# Patient Record
Sex: FEMALE | Race: BLACK OR AFRICAN AMERICAN | NOT HISPANIC OR LATINO | URBAN - METROPOLITAN AREA
[De-identification: names, ages, dates, MRNs, and addresses within clinical notes are randomized per-mention and may not be internally consistent; named-entity substitution may affect disease eponyms.]

---

## 2020-07-17 ENCOUNTER — TELEPHONE ENCOUNTER (OUTPATIENT)
Dept: URBAN - METROPOLITAN AREA CLINIC 92 | Facility: CLINIC | Age: 65
End: 2020-07-17

## 2020-07-17 RX ORDER — DICYCLOMINE HYDROCHLORIDE 10 MG/1
1 TABLET CAPSULE ORAL THREE TIMES A DAY
Qty: 90 | Refills: 1 | OUTPATIENT
Start: 2020-07-17 | End: 2020-09-15

## 2020-10-08 ENCOUNTER — TELEPHONE ENCOUNTER (OUTPATIENT)
Dept: URBAN - METROPOLITAN AREA CLINIC 82 | Facility: CLINIC | Age: 65
End: 2020-10-08

## 2020-10-08 RX ORDER — LUBIPROSTONE 24 UG/1
TAKE 1 CAPSULE (24 MCG) BY ORAL ROUTE 2 TIMES PER DAY WITH FOOD AND WATER FOR 90 DAYS CAPSULE, GELATIN COATED ORAL 2
Qty: 180 | Refills: 1
Start: 2020-04-17

## 2020-10-27 ENCOUNTER — TELEPHONE ENCOUNTER (OUTPATIENT)
Dept: URBAN - METROPOLITAN AREA CLINIC 82 | Facility: CLINIC | Age: 65
End: 2020-10-27

## 2020-10-29 ENCOUNTER — ERX REFILL RESPONSE (OUTPATIENT)
Age: 65
End: 2020-10-29

## 2020-10-29 RX ORDER — LUBIPROSTONE 24 UG/1
TAKE 1 CAPSULE (24 MCG) BY ORAL ROUTE 2 TIMES PER DAY WITH FOOD AND WATER FOR 90 DAYS CAPSULE, GELATIN COATED ORAL
Qty: 180 | Refills: 0

## 2020-11-11 ENCOUNTER — OFFICE VISIT (OUTPATIENT)
Dept: URBAN - METROPOLITAN AREA CLINIC 82 | Facility: CLINIC | Age: 65
End: 2020-11-11
Payer: COMMERCIAL

## 2020-11-11 DIAGNOSIS — R10.84 ABDOMINAL PAIN, GENERALIZED: ICD-10-CM

## 2020-11-11 DIAGNOSIS — K50.90 CROHN DISEASE: ICD-10-CM

## 2020-11-11 DIAGNOSIS — K59.00 CONSTIPATION: ICD-10-CM

## 2020-11-11 DIAGNOSIS — R14.0 BLOATING AND GAS: ICD-10-CM

## 2020-11-11 PROCEDURE — G8417 CALC BMI ABV UP PARAM F/U: HCPCS | Performed by: INTERNAL MEDICINE

## 2020-11-11 PROCEDURE — G8427 DOCREV CUR MEDS BY ELIG CLIN: HCPCS | Performed by: INTERNAL MEDICINE

## 2020-11-11 PROCEDURE — 1036F TOBACCO NON-USER: CPT | Performed by: INTERNAL MEDICINE

## 2020-11-11 PROCEDURE — 99214 OFFICE O/P EST MOD 30 MIN: CPT | Performed by: INTERNAL MEDICINE

## 2020-11-11 RX ORDER — ATENOLOL 25 MG/1
TAKE 1 TABLET (25 MG) BY ORAL ROUTE ONCE DAILY TABLET ORAL 1
Qty: 0 | Refills: 0 | Status: ACTIVE | COMMUNITY
Start: 1900-01-01 | End: 1900-01-01

## 2020-11-11 RX ORDER — CYANOCOBALAMIN (VITAMIN B-12) 500 MCG
TABLET ORAL
Qty: 0 | Refills: 0 | Status: ACTIVE | COMMUNITY
Start: 1900-01-01 | End: 1900-01-01

## 2020-11-11 RX ORDER — FOLIC ACID 1 MG/1
TAKE 1 TABLET (1 MG) BY ORAL ROUTE ONCE DAILY TABLET ORAL 1
Qty: 0 | Refills: 0 | Status: ACTIVE | COMMUNITY
Start: 1900-01-01 | End: 1900-01-01

## 2020-11-11 RX ORDER — BIOTIN 2500 MCG
TAKE 1 CAPSULE BY ORAL ROUTE DAILY CAPSULE ORAL 1
Qty: 0 | Refills: 0 | Status: ACTIVE | COMMUNITY
Start: 1900-01-01 | End: 1900-01-01

## 2020-11-11 RX ORDER — ESCITALOPRAM 20 MG/1
TAKE 1 TABLET (20 MG) BY ORAL ROUTE ONCE DAILY TABLET, FILM COATED ORAL 1
Qty: 0 | Refills: 0 | Status: ACTIVE | COMMUNITY
Start: 1900-01-01 | End: 1900-01-01

## 2020-11-11 RX ORDER — PANTOPRAZOLE SODIUM 40 MG/1
TAKE 1 TABLET (40 MG) BY ORAL ROUTE ONCE DAILY TABLET, DELAYED RELEASE ORAL 1
Qty: 0 | Refills: 0 | Status: ACTIVE | COMMUNITY
Start: 1900-01-01 | End: 1900-01-01

## 2020-11-11 RX ORDER — FUROSEMIDE 40 MG/1
TAKE 1 TABLET (40 MG) BY ORAL ROUTE ONCE DAILY TABLET ORAL 1
Qty: 0 | Refills: 0 | Status: ACTIVE | COMMUNITY
Start: 1900-01-01 | End: 1900-01-01

## 2020-11-11 RX ORDER — NITROGLYCERIN 0.4 MG/1
PLACE 1 TABLET (0.4 MG) BY BUCCAL ROUTE AT THE FIRST SIGN OF AN ATTACK; NO MORE THAN 3 TABS ARE RECOMMENDED WITHIN A 15 MINUTE PERIOD TABLET SUBLINGUAL
Qty: 1 | Refills: 0 | Status: ACTIVE | COMMUNITY
Start: 1900-01-01 | End: 1900-01-01

## 2020-11-11 RX ORDER — FUROSEMIDE 40 MG/1
TABLET ORAL
Qty: 0 | Refills: 0 | Status: ACTIVE | COMMUNITY
Start: 1900-01-01 | End: 1900-01-01

## 2020-11-11 RX ORDER — WARFARIN SODIUM 1 MG/1
TAKE 1 TABLET (1 MG) BY ORAL ROUTE ONCE DAILY TABLET ORAL 1
Qty: 0 | Refills: 0 | Status: ACTIVE | COMMUNITY
Start: 1900-01-01 | End: 1900-01-01

## 2020-11-11 RX ORDER — VERAPAMIL HYDROCHLORIDE 180 MG/1
TAKE 1 TABLET (180 MG) BY ORAL ROUTE ONCE DAILY WITH FOOD TABLET ORAL 1
Qty: 0 | Refills: 0 | Status: ACTIVE | COMMUNITY
Start: 1900-01-01 | End: 1900-01-01

## 2020-11-11 RX ORDER — PREGABALIN 200 MG
TAKE 1 CAPSULE (200 MG) BY ORAL ROUTE 2 TIMES PER DAY CAPSULE ORAL 2
Qty: 0 | Refills: 0 | Status: ACTIVE | COMMUNITY
Start: 1900-01-01 | End: 1900-01-01

## 2020-11-11 RX ORDER — WARFARIN 4 MG/1
TAKE 1 TABLET (4 MG) BY ORAL ROUTE ONCE DAILY TABLET ORAL 1
Qty: 0 | Refills: 0 | Status: ACTIVE | COMMUNITY
Start: 1900-01-01 | End: 1900-01-01

## 2020-11-11 RX ORDER — HYOSCYAMINE SULFATE 0.12 MG/1
1 TABLET AS NEEDED TABLET ORAL
Qty: 90 | Refills: 1 | OUTPATIENT
Start: 2020-11-11 | End: 2021-01-10

## 2020-11-11 RX ORDER — METOPROLOL SUCCINATE 50 MG/1
TABLET, EXTENDED RELEASE ORAL
Qty: 0 | Refills: 0 | Status: ACTIVE | COMMUNITY
Start: 1900-01-01 | End: 1900-01-01

## 2020-11-11 RX ORDER — TRAZODONE HYDROCHLORIDE 100 MG/1
TAKE 1 TABLET (100 MG) BY ORAL ROUTE ONCE DAILY AT BEDTIME TABLET, FILM COATED ORAL 1
Qty: 0 | Refills: 0 | Status: ACTIVE | COMMUNITY
Start: 1900-01-01 | End: 1900-01-01

## 2020-11-11 RX ORDER — FENTANYL 25 UG/H
APPLY 1 PATCH (25 MCG/HOUR) BY TRANSDERMAL ROUTE EVERY 72 HOURS PATCH, EXTENDED RELEASE TRANSDERMAL
Qty: 0 | Refills: 0 | Status: ACTIVE | COMMUNITY
Start: 1900-01-01 | End: 1900-01-01

## 2020-11-11 RX ORDER — LUBIPROSTONE 24 UG/1
TAKE 1 CAPSULE (24 MCG) BY ORAL ROUTE 2 TIMES PER DAY WITH FOOD AND WATER FOR 90 DAYS CAPSULE, GELATIN COATED ORAL
Qty: 180 | Refills: 0 | Status: ACTIVE | COMMUNITY

## 2020-11-11 RX ORDER — RANOLAZINE 500 MG/1
TAKE 1 TABLET (500 MG) BY ORAL ROUTE 2 TIMES PER DAY TABLET, FILM COATED, EXTENDED RELEASE ORAL 2
Qty: 0 | Refills: 0 | Status: ACTIVE | COMMUNITY
Start: 1900-01-01 | End: 1900-01-01

## 2020-11-11 RX ORDER — HYDROXYZINE HYDROCHLORIDE 10 MG/1
TAKE 1 TABLET BY ORAL ROUTE 2 TIMES A DAY TABLET ORAL 2
Qty: 0 | Refills: 0 | Status: ACTIVE | COMMUNITY
Start: 1900-01-01 | End: 1900-01-01

## 2020-11-11 NOTE — HPI-OTHER HISTORIES
This is a follow-up appointment for this patient, a 65 year old /Black female, after a previous visit on 11/12/2015, for an evaluation for medication refill . The patient is here to refill on her hyoscyamine since the pharmacy said that she has been feeling ok from a GI standpoint otherwise. She denies abdominal pain, diarrhea, or blood in the stool.         09/29/2016 Colonoscopy in 2014 did not show any active colitis. Patient complains of upper abdomen and infraumbilical area abdominal pain. She says it began monday night. Patient was admitted into [facility] on July 27, 2016. She says her bowels are fine but complains of severe pain. Patient discontinued Bentyl. She is still taking Protonix. Patient complains of stress and depression.  Denies rectal bleeding.  01/04/2017 Patient says she feels okay. She complains of abdominal cramping, excess noise and bloating. She also complains of constipation with one bowel movement every 4 days. She believes it is because of Lyrica. She is on Dulcolax and Miralax. Denies diarrhea. She says the Dicyclomine leaves a weird taste in her mouth.  02/14/2017 Colonoscopy done 01/31/17 showed normal colonic mucosa, diverticulosis and poor prep. No coltis noted. EGD done on 01/31/17 showed schatzki's ring. Status post dilatation. Gastritis and duodenitis noted. Biopsies were normal for both procedures. Patient is doing well. Denies abdominal pain and rectal bleeding. She is currently on Trazadone, Pentasa, Lyrica and Bentyl. She complains of gout.  05/17/2017 Patient complains of constipation with one bowel movement every 2-3 days. She says she feels like she always needs to have a bowel movement but says she can't. She also complains of straining while trying to have a bowel movement, bloating and lower abdominal pain. Patient is currently on Bentyl tid. She is no longer on Pentasa. Denies any new changes in eating habits.  09/19/2017 Labwork done 05/17/17 showed normal thyroid levels, hemoglobin and liver enzymes. Patient is doing much better. She was recently and was found to have elevated Potassium levels. She admits to occasional lower abdominal pain and change in bowel habits of constipation followed by diarrhea. She was unable to fill Linzess as insurance wouldn't afford it and she could not afford it so she is currently on Miralax. Patient is currently on Bentyl tid. Denies abdominal pain and nausea.  12/08/2017 Labwork done by Dr. Pagan showed slight anemia with a hemoglobin of 10. She is currently on oral iron supplementation and she says she has been on supplementation for a long time. Patient admits to a change in bowel habits with bouts of constipation followed by diarrhea. She also admits to occasional weakness and occasional acid reflux but says it is not often. Denies abdominal pain. Colonoscopy done in 01/2017 was normal and EGD done in 01/2017 showed gastritis.  07/11/2018 Patient admits to occasional cramping and bloating. States she has occasional constipation, but no symptoms in the past 2 weeks. Patient takes iron supplementations. Patient takes Protonix and dicyclomine. Patient admits to occasional fatigue. States she was diagnosed with gout. Symptoms improved. No melena or hematochezia.  1/16/2019 Admits to occasional constipation, has bowel movement every 2-3 days. Currently not on any medication for constipation.  Admits to occasional bloating and abdominal pain. States Linzess not convered by insurance.  Patient states she is still taking Protonix.  4/17/2019 Patient states she saw the NP at Dr. Pagan's office, for frequent nosebleeds. She was never referred to an ENT doctor. She only had labwork done, and was told she had WBC's in her urine. She states Linzess helps her have a bowel movement 3 times a day. She denies any heartburn, states she has acid reflux occasionally. She is not on any medication for acid reflux. She denies any Crohn's flare up recently. Last colonoscopy was done in 2017. She states everything is much better now. She denies any melena or hematochezia.  10/16/2019 Patient states she is still having constipation. She states she has bowel movements every 2 days. She is currently on Linzess and says some days it works and other days it doesn't. She states Linzess occasionally gives her loose bowels. She denies any heartburn or acid reflux. She is currently on Protonix 40 mg po qd.  She states neuropathy is bothering her in her legs and feet. Previous colonoscopy done at the hospital in 2017, showed poor prep. Denies any melena or hematochezia.  02/19/2020 Patient complains of abdominal pain in the lower abdomen, for a couple of weeks and continues to worsen. She does report constipation. She is taking Trulance, which worked well in the beginning and then stopped having bowel movements, until every 4 days. She states Miralax worked for her. She states Linzess did not work for her either. She has never tried Amitiza in the past. Denies any fever or chills. She states abdominal pain improved, not as bad as before. She states feeling that the lining of the abdomen feels raw. She continues to take dicyclomine daily with no improvement.  04/17/2020 I called the patient. She complains of continued problems with her bowels. She still has symptoms of constipation, with bowel movements every 4-5 days. She was taking Amitiza 8 mcg po bid, which was helping for a while. She denies any bloating, but does report abdominal cramping.

## 2020-11-11 NOTE — HPI-TODAY'S VISIT:
11/11/2020 Patient presents for office follow up visit. She reports some occasional constipation. She states Amitiza po bid, helped better than Linzess and Trulance. She is having bowel movements every two days. She has mild acid reflux. She has recently started Percocet, but stopped taking it as it hasn't helped with her neck pain.

## 2021-01-26 ENCOUNTER — TELEPHONE ENCOUNTER (OUTPATIENT)
Dept: URBAN - METROPOLITAN AREA CLINIC 82 | Facility: CLINIC | Age: 66
End: 2021-01-26

## 2021-01-26 RX ORDER — DICYCLOMINE HYDROCHLORIDE 10 MG/1
1 TABLET CAPSULE ORAL THREE TIMES A DAY
Qty: 270 TABLET | Refills: 1 | OUTPATIENT
Start: 2021-01-26 | End: 2021-07-25

## 2021-02-02 ENCOUNTER — TELEPHONE ENCOUNTER (OUTPATIENT)
Dept: URBAN - METROPOLITAN AREA CLINIC 82 | Facility: CLINIC | Age: 66
End: 2021-02-02

## 2021-02-02 RX ORDER — DICYCLOMINE HYDROCHLORIDE 10 MG/1
1 TABLET CAPSULE ORAL THREE TIMES A DAY
Qty: 270
Start: 2021-01-26

## 2021-02-08 ENCOUNTER — TELEPHONE ENCOUNTER (OUTPATIENT)
Dept: URBAN - METROPOLITAN AREA CLINIC 82 | Facility: CLINIC | Age: 66
End: 2021-02-08

## 2021-02-08 RX ORDER — LUBIPROSTONE 24 UG/1
1 CAPSULE WITH FOOD AND WATER CAPSULE, GELATIN COATED ORAL TWICE A DAY
Qty: 180 CAPSULE | Refills: 2

## 2021-04-26 ENCOUNTER — TELEPHONE ENCOUNTER (OUTPATIENT)
Dept: URBAN - METROPOLITAN AREA CLINIC 82 | Facility: CLINIC | Age: 66
End: 2021-04-26

## 2021-04-26 RX ORDER — PANTOPRAZOLE SODIUM 40 MG/1
1 TABLET TABLET, DELAYED RELEASE ORAL 1
Qty: 90 | Refills: 1

## 2021-05-12 ENCOUNTER — OFFICE VISIT (OUTPATIENT)
Dept: URBAN - METROPOLITAN AREA CLINIC 82 | Facility: CLINIC | Age: 66
End: 2021-05-12
Payer: COMMERCIAL

## 2021-05-12 VITALS
DIASTOLIC BLOOD PRESSURE: 68 MMHG | HEART RATE: 73 BPM | RESPIRATION RATE: 18 BRPM | HEIGHT: 66 IN | BODY MASS INDEX: 33.56 KG/M2 | TEMPERATURE: 97.1 F | SYSTOLIC BLOOD PRESSURE: 109 MMHG | WEIGHT: 208.8 LBS

## 2021-05-12 DIAGNOSIS — K59.00 CONSTIPATION: ICD-10-CM

## 2021-05-12 DIAGNOSIS — R10.84 ABDOMINAL PAIN, GENERALIZED: ICD-10-CM

## 2021-05-12 DIAGNOSIS — K50.90 CROHN DISEASE: ICD-10-CM

## 2021-05-12 DIAGNOSIS — R14.0 BLOATING AND GAS: ICD-10-CM

## 2021-05-12 PROCEDURE — G9903 PT SCRN TBCO ID AS NON USER: HCPCS | Performed by: INTERNAL MEDICINE

## 2021-05-12 PROCEDURE — 99214 OFFICE O/P EST MOD 30 MIN: CPT | Performed by: INTERNAL MEDICINE

## 2021-05-12 PROCEDURE — G8417 CALC BMI ABV UP PARAM F/U: HCPCS | Performed by: INTERNAL MEDICINE

## 2021-05-12 PROCEDURE — G8427 DOCREV CUR MEDS BY ELIG CLIN: HCPCS | Performed by: INTERNAL MEDICINE

## 2021-05-12 RX ORDER — PANTOPRAZOLE SODIUM 40 MG/1
1 TABLET TABLET, DELAYED RELEASE ORAL 1
Qty: 90 | Refills: 1 | Status: ACTIVE | COMMUNITY

## 2021-05-12 RX ORDER — VERAPAMIL HYDROCHLORIDE 180 MG/1
TAKE 1 TABLET (180 MG) BY ORAL ROUTE ONCE DAILY WITH FOOD TABLET ORAL 1
Qty: 0 | Refills: 0 | Status: ACTIVE | COMMUNITY
Start: 1900-01-01

## 2021-05-12 RX ORDER — TRAZODONE HYDROCHLORIDE 100 MG/1
TAKE 1 TABLET (100 MG) BY ORAL ROUTE ONCE DAILY AT BEDTIME TABLET, FILM COATED ORAL 1
Qty: 0 | Refills: 0 | Status: ACTIVE | COMMUNITY
Start: 1900-01-01

## 2021-05-12 RX ORDER — BIOTIN 2500 MCG
TAKE 1 CAPSULE BY ORAL ROUTE DAILY CAPSULE ORAL 1
Qty: 0 | Refills: 0 | Status: ACTIVE | COMMUNITY
Start: 1900-01-01

## 2021-05-12 RX ORDER — LUBIPROSTONE 24 UG/1
1 CAPSULE WITH FOOD AND WATER CAPSULE, GELATIN COATED ORAL TWICE A DAY
Qty: 180 CAPSULE | Refills: 2 | Status: ACTIVE | COMMUNITY

## 2021-05-12 RX ORDER — NITROGLYCERIN 0.4 MG/1
PLACE 1 TABLET (0.4 MG) BY BUCCAL ROUTE AT THE FIRST SIGN OF AN ATTACK; NO MORE THAN 3 TABS ARE RECOMMENDED WITHIN A 15 MINUTE PERIOD TABLET SUBLINGUAL
Qty: 1 | Refills: 0 | Status: ACTIVE | COMMUNITY
Start: 1900-01-01

## 2021-05-12 RX ORDER — WARFARIN SODIUM 1 MG/1
TAKE 1 TABLET (1 MG) BY ORAL ROUTE ONCE DAILY TABLET ORAL 1
Qty: 0 | Refills: 0 | Status: ACTIVE | COMMUNITY
Start: 1900-01-01

## 2021-05-12 RX ORDER — FOLIC ACID 1 MG/1
TAKE 1 TABLET (1 MG) BY ORAL ROUTE ONCE DAILY TABLET ORAL 1
Qty: 0 | Refills: 0 | Status: ACTIVE | COMMUNITY
Start: 1900-01-01

## 2021-05-12 RX ORDER — RANOLAZINE 500 MG/1
TAKE 1 TABLET (500 MG) BY ORAL ROUTE 2 TIMES PER DAY TABLET, FILM COATED, EXTENDED RELEASE ORAL 2
Qty: 0 | Refills: 0 | Status: ACTIVE | COMMUNITY
Start: 1900-01-01

## 2021-05-12 RX ORDER — HYDROXYZINE HYDROCHLORIDE 10 MG/1
TAKE 1 TABLET BY ORAL ROUTE 2 TIMES A DAY TABLET ORAL 2
Qty: 0 | Refills: 0 | Status: ACTIVE | COMMUNITY
Start: 1900-01-01

## 2021-05-12 RX ORDER — CYANOCOBALAMIN (VITAMIN B-12) 500 MCG
TABLET ORAL
Qty: 0 | Refills: 0 | Status: ACTIVE | COMMUNITY
Start: 1900-01-01

## 2021-05-12 RX ORDER — FENTANYL 25 UG/H
APPLY 1 PATCH (25 MCG/HOUR) BY TRANSDERMAL ROUTE EVERY 72 HOURS PATCH, EXTENDED RELEASE TRANSDERMAL
Qty: 0 | Refills: 0 | Status: ACTIVE | COMMUNITY
Start: 1900-01-01

## 2021-05-12 RX ORDER — FUROSEMIDE 40 MG/1
TAKE 1 TABLET (40 MG) BY ORAL ROUTE ONCE DAILY TABLET ORAL 1
Qty: 0 | Refills: 0 | Status: ACTIVE | COMMUNITY
Start: 1900-01-01

## 2021-05-12 RX ORDER — FUROSEMIDE 40 MG/1
TABLET ORAL
Qty: 0 | Refills: 0 | Status: ACTIVE | COMMUNITY
Start: 1900-01-01

## 2021-05-12 RX ORDER — LUBIPROSTONE 24 UG/1
1 CAPSULE WITH FOOD AND WATER CAPSULE, GELATIN COATED ORAL TWICE A DAY
Qty: 180 CAPSULE | Refills: 1 | OUTPATIENT
Start: 2021-05-12 | End: 2021-11-07

## 2021-05-12 RX ORDER — DICYCLOMINE HYDROCHLORIDE 10 MG/1
1 TABLET CAPSULE ORAL THREE TIMES A DAY
Qty: 270 | Status: ACTIVE | COMMUNITY
Start: 2021-01-26

## 2021-05-12 RX ORDER — WARFARIN 4 MG/1
TAKE 1 TABLET (4 MG) BY ORAL ROUTE ONCE DAILY TABLET ORAL 1
Qty: 0 | Refills: 0 | Status: ACTIVE | COMMUNITY
Start: 1900-01-01

## 2021-05-12 RX ORDER — HYOSCYAMINE SULFATE 0.12 MG/1
1 TABLET AS NEEDED TABLET ORAL
Qty: 90 | Refills: 1 | OUTPATIENT
Start: 2021-05-12 | End: 2021-11-07

## 2021-05-12 RX ORDER — ESCITALOPRAM 20 MG/1
TAKE 1 TABLET (20 MG) BY ORAL ROUTE ONCE DAILY TABLET, FILM COATED ORAL 1
Qty: 0 | Refills: 0 | Status: ACTIVE | COMMUNITY
Start: 1900-01-01

## 2021-05-12 RX ORDER — PREGABALIN 200 MG
TAKE 1 CAPSULE (200 MG) BY ORAL ROUTE 2 TIMES PER DAY CAPSULE ORAL 2
Qty: 0 | Refills: 0 | Status: ACTIVE | COMMUNITY
Start: 1900-01-01

## 2021-05-12 RX ORDER — METOPROLOL SUCCINATE 50 MG/1
TABLET, EXTENDED RELEASE ORAL
Qty: 0 | Refills: 0 | Status: ACTIVE | COMMUNITY
Start: 1900-01-01

## 2021-05-12 RX ORDER — ATENOLOL 25 MG/1
TAKE 1 TABLET (25 MG) BY ORAL ROUTE ONCE DAILY TABLET ORAL 1
Qty: 0 | Refills: 0 | Status: ACTIVE | COMMUNITY
Start: 1900-01-01

## 2021-05-12 RX ORDER — PANTOPRAZOLE SODIUM 40 MG/1
1 TABLET TABLET, DELAYED RELEASE ORAL ONCE A DAY
Qty: 90 | Refills: 1 | OUTPATIENT
Start: 2021-05-12

## 2021-05-12 NOTE — HPI-OTHER HISTORIES
This is a follow-up appointment for this patient, a 65 year old /Black female, after a previous visit on 11/12/2015, for an evaluation for medication refill . The patient is here to refill on her hyoscyamine since the pharmacy said that she has been feeling ok from a GI standpoint otherwise. She denies abdominal pain, diarrhea, or blood in the stool.         09/29/2016 Colonoscopy in 2014 did not show any active colitis. Patient complains of upper abdomen and infraumbilical area abdominal pain. She says it began monday night. Patient was admitted into [facility] on July 27, 2016. She says her bowels are fine but complains of severe pain. Patient discontinued Bentyl. She is still taking Protonix. Patient complains of stress and depression.  Denies rectal bleeding.  01/04/2017 Patient says she feels okay. She complains of abdominal cramping, excess noise and bloating. She also complains of constipation with one bowel movement every 4 days. She believes it is because of Lyrica. She is on Dulcolax and Miralax. Denies diarrhea. She says the Dicyclomine leaves a weird taste in her mouth.  02/14/2017 Colonoscopy done 01/31/17 showed normal colonic mucosa, diverticulosis and poor prep. No coltis noted. EGD done on 01/31/17 showed schatzki's ring. Status post dilatation. Gastritis and duodenitis noted. Biopsies were normal for both procedures. Patient is doing well. Denies abdominal pain and rectal bleeding. She is currently on Trazadone, Pentasa, Lyrica and Bentyl. She complains of gout.  05/17/2017 Patient complains of constipation with one bowel movement every 2-3 days. She says she feels like she always needs to have a bowel movement but says she can't. She also complains of straining while trying to have a bowel movement, bloating and lower abdominal pain. Patient is currently on Bentyl tid. She is no longer on Pentasa. Denies any new changes in eating habits.  09/19/2017 Labwork done 05/17/17 showed normal thyroid levels, hemoglobin and liver enzymes. Patient is doing much better. She was recently and was found to have elevated Potassium levels. She admits to occasional lower abdominal pain and change in bowel habits of constipation followed by diarrhea. She was unable to fill Linzess as insurance wouldn't afford it and she could not afford it so she is currently on Miralax. Patient is currently on Bentyl tid. Denies abdominal pain and nausea.  12/08/2017 Labwork done by Dr. Pagan showed slight anemia with a hemoglobin of 10. She is currently on oral iron supplementation and she says she has been on supplementation for a long time. Patient admits to a change in bowel habits with bouts of constipation followed by diarrhea. She also admits to occasional weakness and occasional acid reflux but says it is not often. Denies abdominal pain. Colonoscopy done in 01/2017 was normal and EGD done in 01/2017 showed gastritis.  07/11/2018 Patient admits to occasional cramping and bloating. States she has occasional constipation, but no symptoms in the past 2 weeks. Patient takes iron supplementations. Patient takes Protonix and dicyclomine. Patient admits to occasional fatigue. States she was diagnosed with gout. Symptoms improved. No melena or hematochezia.  1/16/2019 Admits to occasional constipation, has bowel movement every 2-3 days. Currently not on any medication for constipation.  Admits to occasional bloating and abdominal pain. States Linzess not convered by insurance.  Patient states she is still taking Protonix.  4/17/2019 Patient states she saw the NP at Dr. Pagan's office, for frequent nosebleeds. She was never referred to an ENT doctor. She only had labwork done, and was told she had WBC's in her urine. She states Linzess helps her have a bowel movement 3 times a day. She denies any heartburn, states she has acid reflux occasionally. She is not on any medication for acid reflux. She denies any Crohn's flare up recently. Last colonoscopy was done in 2017. She states everything is much better now. She denies any melena or hematochezia.  10/16/2019 Patient states she is still having constipation. She states she has bowel movements every 2 days. She is currently on Linzess and says some days it works and other days it doesn't. She states Linzess occasionally gives her loose bowels. She denies any heartburn or acid reflux. She is currently on Protonix 40 mg po qd.  She states neuropathy is bothering her in her legs and feet. Previous colonoscopy done at the hospital in 2017, showed poor prep. Denies any melena or hematochezia.  02/19/2020 Patient complains of abdominal pain in the lower abdomen, for a couple of weeks and continues to worsen. She does report constipation. She is taking Trulance, which worked well in the beginning and then stopped having bowel movements, until every 4 days. She states Miralax worked for her. She states Linzess did not work for her either. She has never tried Amitiza in the past. Denies any fever or chills. She states abdominal pain improved, not as bad as before. She states feeling that the lining of the abdomen feels raw. She continues to take dicyclomine daily with no improvement.  04/17/2020 I called the patient. She complains of continued problems with her bowels. She still has symptoms of constipation, with bowel movements every 4-5 days. She was taking Amitiza 8 mcg po bid, which was helping for a while. She denies any bloating, but does report abdominal cramping.

## 2021-05-12 NOTE — HPI-TODAY'S VISIT:
11/11/2020 Patient presents for office follow up visit. She reports some occasional constipation. She states Amitiza po bid, helped better than Linzess and Trulance. She is having bowel movements every two days. She has mild acid reflux. She has recently started Percocet, but stopped taking it as it hasn't helped with her neck pain.  05/12/2021 Patient presents for a follow up office visit. Colonoscopy on 01/31/2017 showed normal colonic mucosa. Random colon biopsies were normal in 2017. Patient reports occasional abdominal cramping, gas and bloating, mainly when she eats ice cream. She currently takes Amitiza 24mcg twice a day which is helping her constipation. She also takes pantoprazole 40mg and Levsin. She has stopped Pentasa as of 3 years ago. She currently denies any acid reflux symptoms.

## 2021-06-01 ENCOUNTER — TELEPHONE ENCOUNTER (OUTPATIENT)
Dept: URBAN - METROPOLITAN AREA CLINIC 78 | Facility: CLINIC | Age: 66
End: 2021-06-01

## 2021-07-23 ENCOUNTER — TELEPHONE ENCOUNTER (OUTPATIENT)
Dept: URBAN - METROPOLITAN AREA CLINIC 82 | Facility: CLINIC | Age: 66
End: 2021-07-23

## 2021-07-23 RX ORDER — DICYCLOMINE HYDROCHLORIDE 10 MG/1
1 TABLET CAPSULE ORAL THREE TIMES A DAY
Qty: 270
Start: 2021-01-26 | End: 2021-10-21

## 2021-08-24 ENCOUNTER — TELEPHONE ENCOUNTER (OUTPATIENT)
Dept: URBAN - METROPOLITAN AREA CLINIC 92 | Facility: CLINIC | Age: 66
End: 2021-08-24

## 2021-08-24 ENCOUNTER — TELEPHONE ENCOUNTER (OUTPATIENT)
Dept: URBAN - METROPOLITAN AREA CLINIC 82 | Facility: CLINIC | Age: 66
End: 2021-08-24

## 2021-08-24 RX ORDER — DICYCLOMINE HYDROCHLORIDE 10 MG/1
1 TABLET CAPSULE ORAL THREE TIMES A DAY
Qty: 270 TABLET | Refills: 1 | OUTPATIENT
Start: 2021-08-24 | End: 2022-02-19

## 2021-10-21 ENCOUNTER — TELEPHONE ENCOUNTER (OUTPATIENT)
Dept: URBAN - METROPOLITAN AREA CLINIC 82 | Facility: CLINIC | Age: 66
End: 2021-10-21

## 2021-10-21 RX ORDER — DICYCLOMINE HYDROCHLORIDE 10 MG/1
1 TABLET CAPSULE ORAL THREE TIMES A DAY
Qty: 270 | Refills: 0
Start: 2021-01-26 | End: 2022-01-19

## 2021-11-17 ENCOUNTER — OFFICE VISIT (OUTPATIENT)
Dept: URBAN - METROPOLITAN AREA CLINIC 82 | Facility: CLINIC | Age: 66
End: 2021-11-17

## 2021-11-17 ENCOUNTER — OFFICE VISIT (OUTPATIENT)
Dept: URBAN - METROPOLITAN AREA CLINIC 82 | Facility: CLINIC | Age: 66
End: 2021-11-17
Payer: COMMERCIAL

## 2021-11-17 VITALS
WEIGHT: 201.8 LBS | HEIGHT: 66 IN | TEMPERATURE: 97.7 F | SYSTOLIC BLOOD PRESSURE: 75 MMHG | DIASTOLIC BLOOD PRESSURE: 45 MMHG | HEART RATE: 71 BPM | BODY MASS INDEX: 32.43 KG/M2

## 2021-11-17 DIAGNOSIS — I95.9 HYPOTENSION, UNSPECIFIED HYPOTENSION TYPE: ICD-10-CM

## 2021-11-17 DIAGNOSIS — R14.0 BLOATING AND GAS: ICD-10-CM

## 2021-11-17 DIAGNOSIS — K50.90 CROHN DISEASE: ICD-10-CM

## 2021-11-17 DIAGNOSIS — K59.00 CONSTIPATION: ICD-10-CM

## 2021-11-17 DIAGNOSIS — R10.84 ABDOMINAL PAIN, GENERALIZED: ICD-10-CM

## 2021-11-17 PROCEDURE — G8427 DOCREV CUR MEDS BY ELIG CLIN: HCPCS | Performed by: INTERNAL MEDICINE

## 2021-11-17 PROCEDURE — G9903 PT SCRN TBCO ID AS NON USER: HCPCS | Performed by: INTERNAL MEDICINE

## 2021-11-17 PROCEDURE — 99214 OFFICE O/P EST MOD 30 MIN: CPT | Performed by: INTERNAL MEDICINE

## 2021-11-17 PROCEDURE — G8417 CALC BMI ABV UP PARAM F/U: HCPCS | Performed by: INTERNAL MEDICINE

## 2021-11-17 RX ORDER — LUBIPROSTONE 24 UG/1
1 CAPSULE WITH FOOD AND WATER CAPSULE, GELATIN COATED ORAL TWICE A DAY
Qty: 180 CAPSULE | Refills: 2 | Status: ACTIVE | COMMUNITY

## 2021-11-17 RX ORDER — ESCITALOPRAM 20 MG/1
TAKE 1 TABLET (20 MG) BY ORAL ROUTE ONCE DAILY TABLET, FILM COATED ORAL 1
Qty: 0 | Refills: 0 | Status: ACTIVE | COMMUNITY
Start: 1900-01-01

## 2021-11-17 RX ORDER — WARFARIN SODIUM 1 MG/1
TAKE 1 TABLET (1 MG) BY ORAL ROUTE ONCE DAILY TABLET ORAL 1
Qty: 0 | Refills: 0 | Status: ACTIVE | COMMUNITY
Start: 1900-01-01

## 2021-11-17 RX ORDER — METOPROLOL SUCCINATE 50 MG/1
TABLET, EXTENDED RELEASE ORAL
Qty: 0 | Refills: 0 | Status: ACTIVE | COMMUNITY
Start: 1900-01-01

## 2021-11-17 RX ORDER — DICYCLOMINE HYDROCHLORIDE 10 MG/1
1 TABLET CAPSULE ORAL THREE TIMES A DAY
Qty: 270 | Refills: 0 | Status: ACTIVE | COMMUNITY
Start: 2021-01-26 | End: 2022-01-19

## 2021-11-17 RX ORDER — NITROGLYCERIN 0.4 MG/1
PLACE 1 TABLET (0.4 MG) BY BUCCAL ROUTE AT THE FIRST SIGN OF AN ATTACK; NO MORE THAN 3 TABS ARE RECOMMENDED WITHIN A 15 MINUTE PERIOD TABLET SUBLINGUAL
Qty: 1 | Refills: 0 | Status: ACTIVE | COMMUNITY
Start: 1900-01-01

## 2021-11-17 RX ORDER — BIOTIN 2500 MCG
TAKE 1 CAPSULE BY ORAL ROUTE DAILY CAPSULE ORAL 1
Qty: 0 | Refills: 0 | Status: ACTIVE | COMMUNITY
Start: 1900-01-01

## 2021-11-17 RX ORDER — TRAZODONE HYDROCHLORIDE 100 MG/1
TAKE 1 TABLET (100 MG) BY ORAL ROUTE ONCE DAILY AT BEDTIME TABLET, FILM COATED ORAL 1
Qty: 0 | Refills: 0 | Status: ACTIVE | COMMUNITY
Start: 1900-01-01

## 2021-11-17 RX ORDER — FOLIC ACID 1 MG/1
TAKE 1 TABLET (1 MG) BY ORAL ROUTE ONCE DAILY TABLET ORAL 1
Qty: 0 | Refills: 0 | Status: ACTIVE | COMMUNITY
Start: 1900-01-01

## 2021-11-17 RX ORDER — ATENOLOL 25 MG/1
TAKE 1 TABLET (25 MG) BY ORAL ROUTE ONCE DAILY TABLET ORAL 1
Qty: 0 | Refills: 0 | Status: ACTIVE | COMMUNITY
Start: 1900-01-01

## 2021-11-17 RX ORDER — FUROSEMIDE 40 MG/1
TAKE 1 TABLET (40 MG) BY ORAL ROUTE ONCE DAILY TABLET ORAL 1
Qty: 0 | Refills: 0 | Status: ACTIVE | COMMUNITY
Start: 1900-01-01

## 2021-11-17 RX ORDER — FUROSEMIDE 40 MG/1
TABLET ORAL
Qty: 0 | Refills: 0 | Status: ACTIVE | COMMUNITY
Start: 1900-01-01

## 2021-11-17 RX ORDER — HYOSCYAMINE SULFATE 0.12 MG/1
1 TABLET AS NEEDED TABLET ORAL
Qty: 270 TABLET | Refills: 1 | OUTPATIENT
Start: 2021-11-17 | End: 2022-05-16

## 2021-11-17 RX ORDER — HYDROXYZINE HYDROCHLORIDE 10 MG/1
TAKE 1 TABLET BY ORAL ROUTE 2 TIMES A DAY TABLET ORAL 2
Qty: 0 | Refills: 0 | Status: ACTIVE | COMMUNITY
Start: 1900-01-01

## 2021-11-17 RX ORDER — PANTOPRAZOLE SODIUM 40 MG/1
1 TABLET TABLET, DELAYED RELEASE ORAL ONCE A DAY
Qty: 90 | Refills: 1 | Status: ACTIVE | COMMUNITY
Start: 2021-05-12

## 2021-11-17 RX ORDER — WARFARIN 4 MG/1
TAKE 1 TABLET (4 MG) BY ORAL ROUTE ONCE DAILY TABLET ORAL 1
Qty: 0 | Refills: 0 | Status: ACTIVE | COMMUNITY
Start: 1900-01-01

## 2021-11-17 RX ORDER — RANOLAZINE 500 MG/1
TAKE 1 TABLET (500 MG) BY ORAL ROUTE 2 TIMES PER DAY TABLET, FILM COATED, EXTENDED RELEASE ORAL 2
Qty: 0 | Refills: 0 | Status: ACTIVE | COMMUNITY
Start: 1900-01-01

## 2021-11-17 RX ORDER — DICYCLOMINE HYDROCHLORIDE 10 MG/1
1 TABLET CAPSULE ORAL THREE TIMES A DAY
Qty: 270 TABLET | Refills: 1 | Status: ACTIVE | COMMUNITY
Start: 2021-08-24 | End: 2022-02-19

## 2021-11-17 RX ORDER — CYANOCOBALAMIN (VITAMIN B-12) 500 MCG
TABLET ORAL
Qty: 0 | Refills: 0 | Status: ACTIVE | COMMUNITY
Start: 1900-01-01

## 2021-11-17 RX ORDER — PREGABALIN 200 MG
TAKE 1 CAPSULE (200 MG) BY ORAL ROUTE 2 TIMES PER DAY CAPSULE ORAL 2
Qty: 0 | Refills: 0 | Status: ACTIVE | COMMUNITY
Start: 1900-01-01

## 2021-11-17 RX ORDER — FENTANYL 25 UG/H
APPLY 1 PATCH (25 MCG/HOUR) BY TRANSDERMAL ROUTE EVERY 72 HOURS PATCH, EXTENDED RELEASE TRANSDERMAL
Qty: 0 | Refills: 0 | Status: ACTIVE | COMMUNITY
Start: 1900-01-01

## 2021-11-17 RX ORDER — VERAPAMIL HYDROCHLORIDE 180 MG/1
TAKE 1 TABLET (180 MG) BY ORAL ROUTE ONCE DAILY WITH FOOD TABLET ORAL 1
Qty: 0 | Refills: 0 | Status: ACTIVE | COMMUNITY
Start: 1900-01-01

## 2021-11-17 RX ORDER — LUBIPROSTONE 8 UG/1
1 CAPSULE WITH FOOD AND WATER CAPSULE, GELATIN COATED ORAL TWICE A DAY
Qty: 180 CAPSULE | Refills: 1 | OUTPATIENT
Start: 2021-11-17 | End: 2022-05-16

## 2021-11-17 RX ORDER — PANTOPRAZOLE SODIUM 40 MG/1
1 TABLET TABLET, DELAYED RELEASE ORAL 1
Qty: 90 | Refills: 1 | Status: ACTIVE | COMMUNITY

## 2021-11-17 NOTE — PHYSICAL EXAM CONSTITUTIONAL:
well developed, well nourished , in no acute distress , ambulating with difficulty , slurred speech

## 2021-11-17 NOTE — HPI-TODAY'S VISIT:
11/11/2020 Patient presents for office follow up visit. She reports some occasional constipation. She states Amitiza po bid, helped better than Linzess and Trulance. She is having bowel movements every two days. She has mild acid reflux. She has recently started Percocet, but stopped taking it as it hasn't helped with her neck pain.  05/12/2021 Patient presents for a follow up office visit. Colonoscopy on 01/31/2017 showed normal colonic mucosa. Random colon biopsies were normal in 2017. Patient reports occasional abdominal cramping, gas and bloating, mainly when she eats ice cream. She currently takes Amitiza 24mcg twice a day which is helping her constipation. She also takes pantoprazole 40mg and Levsin. She has stopped Pentasa as of 3 years ago. She currently denies any acid reflux symptoms.   11/17/2021 Patient is presently hypotensive (75/45). She states she has been experiencing leg weakness, general fatigue, and dizziness. She sustained a fall on Monday and injured her right knee. Patient denies any changes in bowel habits.

## 2021-11-17 NOTE — HPI-OTHER HISTORIES
This is a follow-up appointment for this patient, a 65 year old /Black female, after a previous visit on 11/12/2015, for an evaluation for medication refill . The patient is here to refill on her hyoscyamine since the pharmacy said that she has been feeling ok from a GI standpoint otherwise. She denies abdominal pain, diarrhea, or blood in the stool.         09/29/2016 Colonoscopy in 2014 did not show any active colitis. Patient complains of upper abdomen and infraumbilical area abdominal pain. She says it began monday night. Patient was admitted into [facility] on July 27, 2016. She says her bowels are fine but complains of severe pain. Patient discontinued Bentyl. She is still taking Protonix. Patient complains of stress and depression.  Denies rectal bleeding.  01/04/2017 Patient says she feels okay. She complains of abdominal cramping, excess noise and bloating. She also complains of constipation with one bowel movement every 4 days. She believes it is because of Lyrica. She is on Dulcolax and Miralax. Denies diarrhea. She says the Dicyclomine leaves a weird taste in her mouth.  02/14/2017 Colonoscopy done 01/31/17 showed normal colonic mucosa, diverticulosis and poor prep. No coltis noted. EGD done on 01/31/17 showed schatzki's ring. Status post dilatation. Gastritis and duodenitis noted. Biopsies were normal for both procedures. Patient is doing well. Denies abdominal pain and rectal bleeding. She is currently on Trazadone, Pentasa, Lyrica and Bentyl. She complains of gout.  05/17/2017 Patient complains of constipation with one bowel movement every 2-3 days. She says she feels like she always needs to have a bowel movement but says she can't. She also complains of straining while trying to have a bowel movement, bloating and lower abdominal pain. Patient is currently on Bentyl tid. She is no longer on Pentasa. Denies any new changes in eating habits.  09/19/2017 Labwork done 05/17/17 showed normal thyroid levels, hemoglobin and liver enzymes. Patient is doing much better. She was recently and was found to have elevated Potassium levels. She admits to occasional lower abdominal pain and change in bowel habits of constipation followed by diarrhea. She was unable to fill Linzess as insurance wouldn't afford it and she could not afford it so she is currently on Miralax. Patient is currently on Bentyl tid. Denies abdominal pain and nausea.  12/08/2017 Labwork done by Dr. Pagan showed slight anemia with a hemoglobin of 10. She is currently on oral iron supplementation and she says she has been on supplementation for a long time. Patient admits to a change in bowel habits with bouts of constipation followed by diarrhea. She also admits to occasional weakness and occasional acid reflux but says it is not often. Denies abdominal pain. Colonoscopy done in 01/2017 was normal and EGD done in 01/2017 showed gastritis.  07/11/2018 Patient admits to occasional cramping and bloating. States she has occasional constipation, but no symptoms in the past 2 weeks. Patient takes iron supplementations. Patient takes Protonix and dicyclomine. Patient admits to occasional fatigue. States she was diagnosed with gout. Symptoms improved. No melena or hematochezia.  1/16/2019 Admits to occasional constipation, has bowel movement every 2-3 days. Currently not on any medication for constipation.  Admits to occasional bloating and abdominal pain. States Linzess not convered by insurance.  Patient states she is still taking Protonix.  4/17/2019 Patient states she saw the NP at Dr. Pagan's office, for frequent nosebleeds. She was never referred to an ENT doctor. She only had labwork done, and was told she had WBC's in her urine. She states Linzess helps her have a bowel movement 3 times a day. She denies any heartburn, states she has acid reflux occasionally. She is not on any medication for acid reflux. She denies any Crohn's flare up recently. Last colonoscopy was done in 2017. She states everything is much better now. She denies any melena or hematochezia.  10/16/2019 Patient states she is still having constipation. She states she has bowel movements every 2 days. She is currently on Linzess and says some days it works and other days it doesn't. She states Linzess occasionally gives her loose bowels. She denies any heartburn or acid reflux. She is currently on Protonix 40 mg po qd.  She states neuropathy is bothering her in her legs and feet. Previous colonoscopy done at the hospital in 2017, showed poor prep. Denies any melena or hematochezia.  02/19/2020 Patient complains of abdominal pain in the lower abdomen, for a couple of weeks and continues to worsen. She does report constipation. She is taking Trulance, which worked well in the beginning and then stopped having bowel movements, until every 4 days. She states Miralax worked for her. She states Linzess did not work for her either. She has never tried Amitiza in the past. Denies any fever or chills. She states abdominal pain improved, not as bad as before. She states feeling that the lining of the abdomen feels raw. She continues to take dicyclomine daily with no improvement.  04/17/2020 I called the patient. She complains of continued problems with her bowels. She still has symptoms of constipation, with bowel movements every 4-5 days. She was taking Amitiza 8 mcg po bid, which was helping for a while. She denies any bloating, but does report abdominal cramping.

## 2021-12-03 ENCOUNTER — TELEPHONE ENCOUNTER (OUTPATIENT)
Dept: URBAN - METROPOLITAN AREA CLINIC 82 | Facility: CLINIC | Age: 66
End: 2021-12-03

## 2022-01-11 ENCOUNTER — TELEPHONE ENCOUNTER (OUTPATIENT)
Dept: URBAN - METROPOLITAN AREA CLINIC 115 | Facility: CLINIC | Age: 67
End: 2022-01-11

## 2022-01-20 ENCOUNTER — TELEPHONE ENCOUNTER (OUTPATIENT)
Dept: URBAN - METROPOLITAN AREA CLINIC 82 | Facility: CLINIC | Age: 67
End: 2022-01-20

## 2022-01-20 RX ORDER — PANTOPRAZOLE SODIUM 40 MG/1
1 TABLET TABLET, DELAYED RELEASE ORAL ONCE A DAY
Qty: 90 TABLET | Refills: 1

## 2022-03-28 ENCOUNTER — TELEPHONE ENCOUNTER (OUTPATIENT)
Dept: URBAN - METROPOLITAN AREA CLINIC 115 | Facility: CLINIC | Age: 67
End: 2022-03-28

## 2022-03-28 RX ORDER — LUBIPROSTONE 8 UG/1
1 CAPSULE WITH FOOD AND WATER CAPSULE, GELATIN COATED ORAL TWICE A DAY
Qty: 180 | Refills: 0
Start: 2021-11-17 | End: 2022-06-26

## 2022-03-30 ENCOUNTER — TELEPHONE ENCOUNTER (OUTPATIENT)
Dept: URBAN - METROPOLITAN AREA CLINIC 82 | Facility: CLINIC | Age: 67
End: 2022-03-30

## 2022-03-30 RX ORDER — DICYCLOMINE HYDROCHLORIDE 10 MG/1
1 TABLET CAPSULE ORAL THREE TIMES A DAY
Qty: 270 | Refills: 0
Start: 2021-08-24 | End: 2022-06-28

## 2022-04-22 ENCOUNTER — OFFICE VISIT (OUTPATIENT)
Dept: URBAN - METROPOLITAN AREA CLINIC 82 | Facility: CLINIC | Age: 67
End: 2022-04-22

## 2022-04-22 RX ORDER — FOLIC ACID 1 MG/1
TAKE 1 TABLET (1 MG) BY ORAL ROUTE ONCE DAILY TABLET ORAL 1
Qty: 0 | Refills: 0 | Status: ACTIVE | COMMUNITY
Start: 1900-01-01

## 2022-04-22 RX ORDER — DICYCLOMINE HYDROCHLORIDE 10 MG/1
1 TABLET CAPSULE ORAL THREE TIMES A DAY
Qty: 270 | Refills: 0 | Status: ACTIVE | COMMUNITY
Start: 2021-08-24 | End: 2022-06-28

## 2022-04-22 RX ORDER — NITROGLYCERIN 0.4 MG/1
PLACE 1 TABLET (0.4 MG) BY BUCCAL ROUTE AT THE FIRST SIGN OF AN ATTACK; NO MORE THAN 3 TABS ARE RECOMMENDED WITHIN A 15 MINUTE PERIOD TABLET SUBLINGUAL
Qty: 1 | Refills: 0 | Status: ACTIVE | COMMUNITY
Start: 1900-01-01

## 2022-04-22 RX ORDER — WARFARIN 4 MG/1
TAKE 1 TABLET (4 MG) BY ORAL ROUTE ONCE DAILY TABLET ORAL 1
Qty: 0 | Refills: 0 | Status: ACTIVE | COMMUNITY
Start: 1900-01-01

## 2022-04-22 RX ORDER — FUROSEMIDE 40 MG/1
TABLET ORAL
Qty: 0 | Refills: 0 | Status: ACTIVE | COMMUNITY
Start: 1900-01-01

## 2022-04-22 RX ORDER — METOPROLOL SUCCINATE 50 MG/1
TABLET, EXTENDED RELEASE ORAL
Qty: 0 | Refills: 0 | Status: ACTIVE | COMMUNITY
Start: 1900-01-01

## 2022-04-22 RX ORDER — PREGABALIN 200 MG
TAKE 1 CAPSULE (200 MG) BY ORAL ROUTE 2 TIMES PER DAY CAPSULE ORAL 2
Qty: 0 | Refills: 0 | Status: ACTIVE | COMMUNITY
Start: 1900-01-01

## 2022-04-22 RX ORDER — PANTOPRAZOLE SODIUM 40 MG/1
1 TABLET TABLET, DELAYED RELEASE ORAL ONCE A DAY
Qty: 90 | Refills: 1 | Status: ACTIVE | COMMUNITY
Start: 2021-05-12

## 2022-04-22 RX ORDER — VERAPAMIL HYDROCHLORIDE 180 MG/1
TAKE 1 TABLET (180 MG) BY ORAL ROUTE ONCE DAILY WITH FOOD TABLET ORAL 1
Qty: 0 | Refills: 0 | Status: ACTIVE | COMMUNITY
Start: 1900-01-01

## 2022-04-22 RX ORDER — LUBIPROSTONE 24 UG/1
1 CAPSULE WITH FOOD AND WATER CAPSULE, GELATIN COATED ORAL TWICE A DAY
Qty: 180 CAPSULE | Refills: 2 | Status: ACTIVE | COMMUNITY

## 2022-04-22 RX ORDER — FENTANYL 25 UG/H
APPLY 1 PATCH (25 MCG/HOUR) BY TRANSDERMAL ROUTE EVERY 72 HOURS PATCH, EXTENDED RELEASE TRANSDERMAL
Qty: 0 | Refills: 0 | Status: ACTIVE | COMMUNITY
Start: 1900-01-01

## 2022-04-22 RX ORDER — FUROSEMIDE 40 MG/1
TAKE 1 TABLET (40 MG) BY ORAL ROUTE ONCE DAILY TABLET ORAL 1
Qty: 0 | Refills: 0 | Status: ACTIVE | COMMUNITY
Start: 1900-01-01

## 2022-04-22 RX ORDER — HYOSCYAMINE SULFATE 0.12 MG/1
1 TABLET AS NEEDED TABLET ORAL
Qty: 270 TABLET | Refills: 1 | Status: ACTIVE | COMMUNITY
Start: 2021-11-17 | End: 2022-05-16

## 2022-04-22 RX ORDER — WARFARIN SODIUM 1 MG/1
TAKE 1 TABLET (1 MG) BY ORAL ROUTE ONCE DAILY TABLET ORAL 1
Qty: 0 | Refills: 0 | Status: ACTIVE | COMMUNITY
Start: 1900-01-01

## 2022-04-22 RX ORDER — BIOTIN 2500 MCG
TAKE 1 CAPSULE BY ORAL ROUTE DAILY CAPSULE ORAL 1
Qty: 0 | Refills: 0 | Status: ACTIVE | COMMUNITY
Start: 1900-01-01

## 2022-04-22 RX ORDER — RANOLAZINE 500 MG/1
TAKE 1 TABLET (500 MG) BY ORAL ROUTE 2 TIMES PER DAY TABLET, FILM COATED, EXTENDED RELEASE ORAL 2
Qty: 0 | Refills: 0 | Status: ACTIVE | COMMUNITY
Start: 1900-01-01

## 2022-04-22 RX ORDER — ESCITALOPRAM 20 MG/1
TAKE 1 TABLET (20 MG) BY ORAL ROUTE ONCE DAILY TABLET, FILM COATED ORAL 1
Qty: 0 | Refills: 0 | Status: ACTIVE | COMMUNITY
Start: 1900-01-01

## 2022-04-22 RX ORDER — TRAZODONE HYDROCHLORIDE 100 MG/1
TAKE 1 TABLET (100 MG) BY ORAL ROUTE ONCE DAILY AT BEDTIME TABLET, FILM COATED ORAL 1
Qty: 0 | Refills: 0 | Status: ACTIVE | COMMUNITY
Start: 1900-01-01

## 2022-04-22 RX ORDER — LUBIPROSTONE 8 UG/1
1 CAPSULE WITH FOOD AND WATER CAPSULE, GELATIN COATED ORAL TWICE A DAY
Qty: 180 | Refills: 0 | Status: ACTIVE | COMMUNITY
Start: 2021-11-17 | End: 2022-06-26

## 2022-04-22 RX ORDER — CYANOCOBALAMIN (VITAMIN B-12) 500 MCG
TABLET ORAL
Qty: 0 | Refills: 0 | Status: ACTIVE | COMMUNITY
Start: 1900-01-01

## 2022-04-22 RX ORDER — ATENOLOL 25 MG/1
TAKE 1 TABLET (25 MG) BY ORAL ROUTE ONCE DAILY TABLET ORAL 1
Qty: 0 | Refills: 0 | Status: ACTIVE | COMMUNITY
Start: 1900-01-01

## 2022-04-22 RX ORDER — HYDROXYZINE HYDROCHLORIDE 10 MG/1
TAKE 1 TABLET BY ORAL ROUTE 2 TIMES A DAY TABLET ORAL 2
Qty: 0 | Refills: 0 | Status: ACTIVE | COMMUNITY
Start: 1900-01-01

## 2022-04-22 RX ORDER — PANTOPRAZOLE SODIUM 40 MG/1
1 TABLET TABLET, DELAYED RELEASE ORAL ONCE A DAY
Qty: 90 TABLET | Refills: 1 | Status: ACTIVE | COMMUNITY

## 2022-06-14 ENCOUNTER — OFFICE VISIT (OUTPATIENT)
Dept: URBAN - METROPOLITAN AREA CLINIC 82 | Facility: CLINIC | Age: 67
End: 2022-06-14
Payer: COMMERCIAL

## 2022-06-14 ENCOUNTER — OFFICE VISIT (OUTPATIENT)
Dept: URBAN - METROPOLITAN AREA CLINIC 82 | Facility: CLINIC | Age: 67
End: 2022-06-14

## 2022-06-14 VITALS
SYSTOLIC BLOOD PRESSURE: 111 MMHG | TEMPERATURE: 98.2 F | BODY MASS INDEX: 31.71 KG/M2 | DIASTOLIC BLOOD PRESSURE: 73 MMHG | HEIGHT: 66 IN | WEIGHT: 197.3 LBS | HEART RATE: 99 BPM

## 2022-06-14 DIAGNOSIS — I95.9 HYPOTENSION, UNSPECIFIED HYPOTENSION TYPE: ICD-10-CM

## 2022-06-14 DIAGNOSIS — K59.00 CONSTIPATION: ICD-10-CM

## 2022-06-14 DIAGNOSIS — K50.90 CROHN DISEASE: ICD-10-CM

## 2022-06-14 DIAGNOSIS — R10.84 ABDOMINAL PAIN, GENERALIZED: ICD-10-CM

## 2022-06-14 DIAGNOSIS — R14.0 BLOATING AND GAS: ICD-10-CM

## 2022-06-14 PROCEDURE — G8419 CALC BMI OUT NRM PARAM NOF/U: HCPCS | Performed by: INTERNAL MEDICINE

## 2022-06-14 PROCEDURE — 99214 OFFICE O/P EST MOD 30 MIN: CPT | Performed by: INTERNAL MEDICINE

## 2022-06-14 PROCEDURE — G9903 PT SCRN TBCO ID AS NON USER: HCPCS | Performed by: INTERNAL MEDICINE

## 2022-06-14 PROCEDURE — G8427 DOCREV CUR MEDS BY ELIG CLIN: HCPCS | Performed by: INTERNAL MEDICINE

## 2022-06-14 RX ORDER — VERAPAMIL HYDROCHLORIDE 180 MG/1
TAKE 1 TABLET (180 MG) BY ORAL ROUTE ONCE DAILY WITH FOOD TABLET ORAL 1
Qty: 0 | Refills: 0 | Status: ACTIVE | COMMUNITY
Start: 1900-01-01

## 2022-06-14 RX ORDER — WARFARIN 4 MG/1
TAKE 1 TABLET (4 MG) BY ORAL ROUTE ONCE DAILY TABLET ORAL 1
Qty: 0 | Refills: 0 | Status: ACTIVE | COMMUNITY
Start: 1900-01-01

## 2022-06-14 RX ORDER — LUBIPROSTONE 8 UG/1
1 CAPSULE WITH FOOD AND WATER CAPSULE, GELATIN COATED ORAL TWICE A DAY
Qty: 180 | Refills: 0 | Status: ACTIVE | COMMUNITY
Start: 2021-11-17 | End: 2022-06-26

## 2022-06-14 RX ORDER — FUROSEMIDE 40 MG/1
TABLET ORAL
Qty: 0 | Refills: 0 | Status: ACTIVE | COMMUNITY
Start: 1900-01-01

## 2022-06-14 RX ORDER — PANTOPRAZOLE SODIUM 40 MG/1
1 TABLET TABLET, DELAYED RELEASE ORAL ONCE A DAY
Qty: 90 TABLET | Refills: 1 | Status: ACTIVE | COMMUNITY

## 2022-06-14 RX ORDER — LUBIPROSTONE 24 UG/1
1 CAPSULE WITH FOOD AND WATER CAPSULE, GELATIN COATED ORAL TWICE A DAY
Qty: 180 CAPSULE | Refills: 1 | OUTPATIENT
Start: 2022-06-14 | End: 2022-12-10

## 2022-06-14 RX ORDER — FOLIC ACID 1 MG/1
TAKE 1 TABLET (1 MG) BY ORAL ROUTE ONCE DAILY TABLET ORAL 1
Qty: 0 | Refills: 0 | Status: ACTIVE | COMMUNITY
Start: 1900-01-01

## 2022-06-14 RX ORDER — FENTANYL 25 UG/H
APPLY 1 PATCH (25 MCG/HOUR) BY TRANSDERMAL ROUTE EVERY 72 HOURS PATCH, EXTENDED RELEASE TRANSDERMAL
Qty: 0 | Refills: 0 | Status: ACTIVE | COMMUNITY
Start: 1900-01-01

## 2022-06-14 RX ORDER — METOPROLOL SUCCINATE 50 MG/1
TABLET, EXTENDED RELEASE ORAL
Qty: 0 | Refills: 0 | Status: ACTIVE | COMMUNITY
Start: 1900-01-01

## 2022-06-14 RX ORDER — LUBIPROSTONE 24 UG/1
1 CAPSULE WITH FOOD AND WATER CAPSULE, GELATIN COATED ORAL TWICE A DAY
Qty: 180 CAPSULE | Refills: 2 | Status: ACTIVE | COMMUNITY

## 2022-06-14 RX ORDER — CYANOCOBALAMIN (VITAMIN B-12) 500 MCG
TABLET ORAL
Qty: 0 | Refills: 0 | Status: ACTIVE | COMMUNITY
Start: 1900-01-01

## 2022-06-14 RX ORDER — ESCITALOPRAM 20 MG/1
TAKE 1 TABLET (20 MG) BY ORAL ROUTE ONCE DAILY TABLET, FILM COATED ORAL 1
Qty: 0 | Refills: 0 | Status: ACTIVE | COMMUNITY
Start: 1900-01-01

## 2022-06-14 RX ORDER — FUROSEMIDE 40 MG/1
TAKE 1 TABLET (40 MG) BY ORAL ROUTE ONCE DAILY TABLET ORAL 1
Qty: 0 | Refills: 0 | Status: ACTIVE | COMMUNITY
Start: 1900-01-01

## 2022-06-14 RX ORDER — WARFARIN SODIUM 1 MG/1
TAKE 1 TABLET (1 MG) BY ORAL ROUTE ONCE DAILY TABLET ORAL 1
Qty: 0 | Refills: 0 | Status: ACTIVE | COMMUNITY
Start: 1900-01-01

## 2022-06-14 RX ORDER — PREGABALIN 200 MG
TAKE 1 CAPSULE (200 MG) BY ORAL ROUTE 2 TIMES PER DAY CAPSULE ORAL 2
Qty: 0 | Refills: 0 | Status: ACTIVE | COMMUNITY
Start: 1900-01-01

## 2022-06-14 RX ORDER — DICYCLOMINE HYDROCHLORIDE 10 MG/1
1 TABLET CAPSULE ORAL THREE TIMES A DAY
Qty: 270 | Refills: 0 | Status: ACTIVE | COMMUNITY
Start: 2021-08-24 | End: 2022-06-28

## 2022-06-14 RX ORDER — PANTOPRAZOLE SODIUM 40 MG/1
1 TABLET TABLET, DELAYED RELEASE ORAL ONCE A DAY
Qty: 90 | Refills: 1 | OUTPATIENT
Start: 2022-06-14

## 2022-06-14 RX ORDER — BIOTIN 2500 MCG
TAKE 1 CAPSULE BY ORAL ROUTE DAILY CAPSULE ORAL 1
Qty: 0 | Refills: 0 | Status: ACTIVE | COMMUNITY
Start: 1900-01-01

## 2022-06-14 RX ORDER — NITROGLYCERIN 0.4 MG/1
PLACE 1 TABLET (0.4 MG) BY BUCCAL ROUTE AT THE FIRST SIGN OF AN ATTACK; NO MORE THAN 3 TABS ARE RECOMMENDED WITHIN A 15 MINUTE PERIOD TABLET SUBLINGUAL
Qty: 1 | Refills: 0 | Status: ACTIVE | COMMUNITY
Start: 1900-01-01

## 2022-06-14 RX ORDER — TRAZODONE HYDROCHLORIDE 100 MG/1
TAKE 1 TABLET (100 MG) BY ORAL ROUTE ONCE DAILY AT BEDTIME TABLET, FILM COATED ORAL 1
Qty: 0 | Refills: 0 | Status: ACTIVE | COMMUNITY
Start: 1900-01-01

## 2022-06-14 RX ORDER — PANTOPRAZOLE SODIUM 40 MG/1
1 TABLET TABLET, DELAYED RELEASE ORAL ONCE A DAY
Qty: 90 | Refills: 1 | Status: ACTIVE | COMMUNITY
Start: 2021-05-12

## 2022-06-14 RX ORDER — HYDROXYZINE HYDROCHLORIDE 10 MG/1
TAKE 1 TABLET BY ORAL ROUTE 2 TIMES A DAY TABLET ORAL 2
Qty: 0 | Refills: 0 | Status: ACTIVE | COMMUNITY
Start: 1900-01-01

## 2022-06-14 RX ORDER — RANOLAZINE 500 MG/1
TAKE 1 TABLET (500 MG) BY ORAL ROUTE 2 TIMES PER DAY TABLET, FILM COATED, EXTENDED RELEASE ORAL 2
Qty: 0 | Refills: 0 | Status: ACTIVE | COMMUNITY
Start: 1900-01-01

## 2022-06-14 RX ORDER — HYOSCYAMINE SULFATE 0.12 MG/1
1 TABLET AS NEEDED TABLET ORAL
Qty: 90 | Refills: 1 | OUTPATIENT
Start: 2022-06-14 | End: 2022-08-13

## 2022-06-14 RX ORDER — ATENOLOL 25 MG/1
TAKE 1 TABLET (25 MG) BY ORAL ROUTE ONCE DAILY TABLET ORAL 1
Qty: 0 | Refills: 0 | Status: ACTIVE | COMMUNITY
Start: 1900-01-01

## 2022-06-14 NOTE — HPI-TODAY'S VISIT:
11/11/2020 Patient presents for office follow up visit. She reports some occasional constipation. She states Amitiza po bid, helped better than Linzess and Trulance. She is having bowel movements every two days. She has mild acid reflux. She has recently started Percocet, but stopped taking it as it hasn't helped with her neck pain.  05/12/2021 Patient presents for a follow up office visit. Colonoscopy on 01/31/2017 showed normal colonic mucosa. Random colon biopsies were normal in 2017. Patient reports occasional abdominal cramping, gas and bloating, mainly when she eats ice cream. She currently takes Amitiza 24mcg twice a day which is helping her constipation. She also takes pantoprazole 40mg and Levsin. She has stopped Pentasa as of 3 years ago. She currently denies any acid reflux symptoms.   11/17/2021 Patient is presently hypotensive (75/45). She states she has been experiencing leg weakness, general fatigue, and dizziness. She sustained a fall on Monday and injured her right knee. Patient denies any changes in bowel habits.  06/14/2022 Patient presents for IBS and acid reflux. Patient takes amitiza but says that she still has constipation. Patient states that linzess did not help her and that the amitiza helps her more. Patient states the acid reflux is okay. Patient states that she fell around Thanksgiving and that she had developed a brain bleed. The patient states that it was a very bad pain. She also states that she went to the hospital in January due to very bad blood pressure drop. The last time the patient went to the hospital was in March for leg pain and said she had clots. She says that she is not able to walk very well. Patient says she gets abdominal pain at times.

## 2022-06-14 NOTE — HPI-OTHER HISTORIES
This is a follow-up appointment for this patient, a 65 year old /Black female, after a previous visit on 11/12/2015, for an evaluation for medication refill . The patient is here to refill on her hyoscyamine since the pharmacy said that she has been feeling ok from a GI standpoint otherwise. She denies abdominal pain, diarrhea, or blood in the stool.         09/29/2016 Colonoscopy in 2014 did not show any active colitis. Patient complains of upper abdomen and infraumbilical area abdominal pain. She says it began monday night. Patient was admitted into [facility] on July 27, 2016. She says her bowels are fine but complains of severe pain. Patient discontinued Bentyl. She is still taking Protonix. Patient complains of stress and depression.  Denies rectal bleeding.  01/04/2017 Patient says she feels okay. She complains of abdominal cramping, excess noise and bloating. She also complains of constipation with one bowel movement every 4 days. She believes it is because of Lyrica. She is on Dulcolax and Miralax. Denies diarrhea. She says the Dicyclomine leaves a weird taste in her mouth.  02/14/2017 Colonoscopy done 01/31/17 showed normal colonic mucosa, diverticulosis and poor prep. No coltis noted. EGD done on 01/31/17 showed schatzki's ring. Status post dilatation. Gastritis and duodenitis noted. Biopsies were normal for both procedures. Patient is doing well. Denies abdominal pain and rectal bleeding. She is currently on Trazadone, Pentasa, Lyrica and Bentyl. She complains of gout.  05/17/2017 Patient complains of constipation with one bowel movement every 2-3 days. She says she feels like she always needs to have a bowel movement but says she can't. She also complains of straining while trying to have a bowel movement, bloating and lower abdominal pain. Patient is currently on Bentyl tid. She is no longer on Pentasa. Denies any new changes in eating habits.  09/19/2017 Labwork done 05/17/17 showed normal thyroid levels, hemoglobin and liver enzymes. Patient is doing much better. She was recently and was found to have elevated Potassium levels. She admits to occasional lower abdominal pain and change in bowel habits of constipation followed by diarrhea. She was unable to fill Linzess as insurance wouldn't afford it and she could not afford it so she is currently on Miralax. Patient is currently on Bentyl tid. Denies abdominal pain and nausea.  12/08/2017 Labwork done by Dr. Pagan showed slight anemia with a hemoglobin of 10. She is currently on oral iron supplementation and she says she has been on supplementation for a long time. Patient admits to a change in bowel habits with bouts of constipation followed by diarrhea. She also admits to occasional weakness and occasional acid reflux but says it is not often. Denies abdominal pain. Colonoscopy done in 01/2017 was normal and EGD done in 01/2017 showed gastritis.  07/11/2018 Patient admits to occasional cramping and bloating. States she has occasional constipation, but no symptoms in the past 2 weeks. Patient takes iron supplementations. Patient takes Protonix and dicyclomine. Patient admits to occasional fatigue. States she was diagnosed with gout. Symptoms improved. No melena or hematochezia.  1/16/2019 Admits to occasional constipation, has bowel movement every 2-3 days. Currently not on any medication for constipation.  Admits to occasional bloating and abdominal pain. States Linzess not convered by insurance.  Patient states she is still taking Protonix.  4/17/2019 Patient states she saw the NP at Dr. Pagan's office, for frequent nosebleeds. She was never referred to an ENT doctor. She only had labwork done, and was told she had WBC's in her urine. She states Linzess helps her have a bowel movement 3 times a day. She denies any heartburn, states she has acid reflux occasionally. She is not on any medication for acid reflux. She denies any Crohn's flare up recently. Last colonoscopy was done in 2017. She states everything is much better now. She denies any melena or hematochezia.  10/16/2019 Patient states she is still having constipation. She states she has bowel movements every 2 days. She is currently on Linzess and says some days it works and other days it doesn't. She states Linzess occasionally gives her loose bowels. She denies any heartburn or acid reflux. She is currently on Protonix 40 mg po qd.  She states neuropathy is bothering her in her legs and feet. Previous colonoscopy done at the hospital in 2017, showed poor prep. Denies any melena or hematochezia.  02/19/2020 Patient complains of abdominal pain in the lower abdomen, for a couple of weeks and continues to worsen. She does report constipation. She is taking Trulance, which worked well in the beginning and then stopped having bowel movements, until every 4 days. She states Miralax worked for her. She states Linzess did not work for her either. She has never tried Amitiza in the past. Denies any fever or chills. She states abdominal pain improved, not as bad as before. She states feeling that the lining of the abdomen feels raw. She continues to take dicyclomine daily with no improvement.  04/17/2020 I called the patient. She complains of continued problems with her bowels. She still has symptoms of constipation, with bowel movements every 4-5 days. She was taking Amitiza 8 mcg po bid, which was helping for a while. She denies any bloating, but does report abdominal cramping. Patient is complaining of constipation and bloating.  She also complains of occasional heartburn

## 2022-06-15 ENCOUNTER — TELEPHONE ENCOUNTER (OUTPATIENT)
Dept: URBAN - METROPOLITAN AREA CLINIC 82 | Facility: CLINIC | Age: 67
End: 2022-06-15

## 2022-06-15 RX ORDER — PLECANATIDE 3 MG/1
1 TABLET TABLET ORAL ONCE A DAY
Qty: 90 | Refills: 1 | OUTPATIENT
Start: 2022-06-17 | End: 2022-12-13

## 2022-07-19 ENCOUNTER — TELEPHONE ENCOUNTER (OUTPATIENT)
Dept: URBAN - METROPOLITAN AREA CLINIC 115 | Facility: CLINIC | Age: 67
End: 2022-07-19

## 2022-07-19 RX ORDER — LUBIPROSTONE 24 UG/1
1 CAPSULE WITH FOOD AND WATER CAPSULE, GELATIN COATED ORAL TWICE A DAY
Qty: 180 | Refills: 0
Start: 2022-06-14 | End: 2022-10-17

## 2022-10-19 ENCOUNTER — LAB OUTSIDE AN ENCOUNTER (OUTPATIENT)
Dept: URBAN - METROPOLITAN AREA CLINIC 82 | Facility: CLINIC | Age: 67
End: 2022-10-19

## 2022-10-19 ENCOUNTER — OFFICE VISIT (OUTPATIENT)
Dept: URBAN - METROPOLITAN AREA CLINIC 82 | Facility: CLINIC | Age: 67
End: 2022-10-19
Payer: COMMERCIAL

## 2022-10-19 VITALS
DIASTOLIC BLOOD PRESSURE: 59 MMHG | HEIGHT: 66 IN | TEMPERATURE: 97.3 F | WEIGHT: 191.8 LBS | HEART RATE: 106 BPM | BODY MASS INDEX: 30.82 KG/M2 | SYSTOLIC BLOOD PRESSURE: 93 MMHG | RESPIRATION RATE: 14 BRPM

## 2022-10-19 DIAGNOSIS — D50.9 IRON DEFICIENCY ANEMIA, UNSPECIFIED IRON DEFICIENCY ANEMIA TYPE: ICD-10-CM

## 2022-10-19 DIAGNOSIS — R10.84 ABDOMINAL PAIN, GENERALIZED: ICD-10-CM

## 2022-10-19 DIAGNOSIS — R14.0 BLOATING AND GAS: ICD-10-CM

## 2022-10-19 DIAGNOSIS — I95.9 HYPOTENSION, UNSPECIFIED HYPOTENSION TYPE: ICD-10-CM

## 2022-10-19 DIAGNOSIS — K50.90 CROHN DISEASE: ICD-10-CM

## 2022-10-19 DIAGNOSIS — K59.00 CONSTIPATION: ICD-10-CM

## 2022-10-19 PROBLEM — 34000006 CROHN DISEASE: Status: ACTIVE | Noted: 2020-11-11

## 2022-10-19 PROBLEM — 271832001 FLATULENCE, ERUCTATION AND GAS PAIN: Status: ACTIVE | Noted: 2020-11-11

## 2022-10-19 PROCEDURE — G8427 DOCREV CUR MEDS BY ELIG CLIN: HCPCS | Performed by: INTERNAL MEDICINE

## 2022-10-19 PROCEDURE — 99214 OFFICE O/P EST MOD 30 MIN: CPT | Performed by: INTERNAL MEDICINE

## 2022-10-19 PROCEDURE — G9903 PT SCRN TBCO ID AS NON USER: HCPCS | Performed by: INTERNAL MEDICINE

## 2022-10-19 PROCEDURE — G8419 CALC BMI OUT NRM PARAM NOF/U: HCPCS | Performed by: INTERNAL MEDICINE

## 2022-10-19 RX ORDER — HYDROXYZINE HYDROCHLORIDE 10 MG/1
TAKE 1 TABLET BY ORAL ROUTE 2 TIMES A DAY TABLET ORAL 2
Qty: 0 | Refills: 0 | Status: ACTIVE | COMMUNITY
Start: 1900-01-01

## 2022-10-19 RX ORDER — TRAZODONE HYDROCHLORIDE 100 MG/1
TAKE 1 TABLET (100 MG) BY ORAL ROUTE ONCE DAILY AT BEDTIME TABLET, FILM COATED ORAL 1
Qty: 0 | Refills: 0 | Status: ACTIVE | COMMUNITY
Start: 1900-01-01

## 2022-10-19 RX ORDER — PANTOPRAZOLE SODIUM 40 MG/1
1 TABLET TABLET, DELAYED RELEASE ORAL ONCE A DAY
Qty: 90 TABLET | Refills: 1 | Status: ACTIVE | COMMUNITY

## 2022-10-19 RX ORDER — FUROSEMIDE 40 MG/1
TAKE 1 TABLET (40 MG) BY ORAL ROUTE ONCE DAILY TABLET ORAL 1
Qty: 0 | Refills: 0 | Status: ACTIVE | COMMUNITY
Start: 1900-01-01

## 2022-10-19 RX ORDER — METOPROLOL SUCCINATE 50 MG/1
TABLET, EXTENDED RELEASE ORAL
Qty: 0 | Refills: 0 | Status: ACTIVE | COMMUNITY
Start: 1900-01-01

## 2022-10-19 RX ORDER — VERAPAMIL HYDROCHLORIDE 180 MG/1
TAKE 1 TABLET (180 MG) BY ORAL ROUTE ONCE DAILY WITH FOOD TABLET ORAL 1
Qty: 0 | Refills: 0 | Status: ACTIVE | COMMUNITY
Start: 1900-01-01

## 2022-10-19 RX ORDER — NITROGLYCERIN 0.4 MG/1
PLACE 1 TABLET (0.4 MG) BY BUCCAL ROUTE AT THE FIRST SIGN OF AN ATTACK; NO MORE THAN 3 TABS ARE RECOMMENDED WITHIN A 15 MINUTE PERIOD TABLET SUBLINGUAL
Qty: 1 | Refills: 0 | Status: ACTIVE | COMMUNITY
Start: 1900-01-01

## 2022-10-19 RX ORDER — PANTOPRAZOLE SODIUM 40 MG/1
1 TABLET TABLET, DELAYED RELEASE ORAL ONCE A DAY
Qty: 90 | Refills: 1 | Status: ACTIVE | COMMUNITY
Start: 2022-06-14

## 2022-10-19 RX ORDER — BIOTIN 2500 MCG
TAKE 1 CAPSULE BY ORAL ROUTE DAILY CAPSULE ORAL 1
Qty: 0 | Refills: 0 | Status: ACTIVE | COMMUNITY
Start: 1900-01-01

## 2022-10-19 RX ORDER — PANTOPRAZOLE SODIUM 40 MG/1
1 TABLET TABLET, DELAYED RELEASE ORAL ONCE A DAY
Qty: 90 | Refills: 1 | Status: ACTIVE | COMMUNITY
Start: 2021-05-12

## 2022-10-19 RX ORDER — ATENOLOL 25 MG/1
TAKE 1 TABLET (25 MG) BY ORAL ROUTE ONCE DAILY TABLET ORAL 1
Qty: 0 | Refills: 0 | Status: ACTIVE | COMMUNITY
Start: 1900-01-01

## 2022-10-19 RX ORDER — PREGABALIN 200 MG
TAKE 1 CAPSULE (200 MG) BY ORAL ROUTE 2 TIMES PER DAY CAPSULE ORAL 2
Qty: 0 | Refills: 0 | Status: ACTIVE | COMMUNITY
Start: 1900-01-01

## 2022-10-19 RX ORDER — RANOLAZINE 500 MG/1
TAKE 1 TABLET (500 MG) BY ORAL ROUTE 2 TIMES PER DAY TABLET, FILM COATED, EXTENDED RELEASE ORAL 2
Qty: 0 | Refills: 0 | Status: ACTIVE | COMMUNITY
Start: 1900-01-01

## 2022-10-19 RX ORDER — FOLIC ACID 1 MG/1
TAKE 1 TABLET (1 MG) BY ORAL ROUTE ONCE DAILY TABLET ORAL 1
Qty: 0 | Refills: 0 | Status: ACTIVE | COMMUNITY
Start: 1900-01-01

## 2022-10-19 RX ORDER — PLECANATIDE 3 MG/1
1 TABLET TABLET ORAL ONCE A DAY
Qty: 90 | Refills: 1 | Status: ACTIVE | COMMUNITY
Start: 2022-06-17 | End: 2022-12-13

## 2022-10-19 RX ORDER — CYANOCOBALAMIN (VITAMIN B-12) 500 MCG
TABLET ORAL
Qty: 0 | Refills: 0 | Status: ACTIVE | COMMUNITY
Start: 1900-01-01

## 2022-10-19 RX ORDER — FUROSEMIDE 40 MG/1
TABLET ORAL
Qty: 0 | Refills: 0 | Status: ACTIVE | COMMUNITY
Start: 1900-01-01

## 2022-10-19 RX ORDER — ESCITALOPRAM 20 MG/1
TAKE 1 TABLET (20 MG) BY ORAL ROUTE ONCE DAILY TABLET, FILM COATED ORAL 1
Qty: 0 | Refills: 0 | Status: ACTIVE | COMMUNITY
Start: 1900-01-01

## 2022-10-19 RX ORDER — WARFARIN 4 MG/1
TAKE 1 TABLET (4 MG) BY ORAL ROUTE ONCE DAILY TABLET ORAL 1
Qty: 0 | Refills: 0 | Status: ACTIVE | COMMUNITY
Start: 1900-01-01

## 2022-10-19 RX ORDER — LUBIPROSTONE 24 UG/1
1 CAPSULE WITH FOOD AND WATER CAPSULE, GELATIN COATED ORAL TWICE A DAY
Qty: 180 CAPSULE | Refills: 2 | Status: ACTIVE | COMMUNITY

## 2022-10-19 RX ORDER — WARFARIN SODIUM 1 MG/1
TAKE 1 TABLET (1 MG) BY ORAL ROUTE ONCE DAILY TABLET ORAL 1
Qty: 0 | Refills: 0 | Status: ACTIVE | COMMUNITY
Start: 1900-01-01

## 2022-10-19 RX ORDER — POLYETHYLENE GLYCOL-3350 AND ELECTROLYTES WITH FLAVOR PACK 240; 5.84; 2.98; 6.72; 22.72 G/278.26G; G/278.26G; G/278.26G; G/278.26G; G/278.26G
AS DIRECTED POWDER, FOR SOLUTION ORAL
Qty: 1 | Refills: 0 | OUTPATIENT
Start: 2022-10-19 | End: 2022-10-20

## 2022-10-19 RX ORDER — FENTANYL 25 UG/H
APPLY 1 PATCH (25 MCG/HOUR) BY TRANSDERMAL ROUTE EVERY 72 HOURS PATCH, EXTENDED RELEASE TRANSDERMAL
Qty: 0 | Refills: 0 | Status: ACTIVE | COMMUNITY
Start: 1900-01-01

## 2022-10-19 NOTE — HPI-OTHER HISTORIES
This is a follow-up appointment for this patient, a 65 year old /Black female, after a previous visit on 11/12/2015, for an evaluation for medication refill . The patient is here to refill on her hyoscyamine since the pharmacy said that she has been feeling ok from a GI standpoint otherwise. She denies abdominal pain, diarrhea, or blood in the stool.         09/29/2016 Colonoscopy in 2014 did not show any active colitis. Patient complains of upper abdomen and infraumbilical area abdominal pain. She says it began monday night. Patient was admitted into [facility] on July 27, 2016. She says her bowels are fine but complains of severe pain. Patient discontinued Bentyl. She is still taking Protonix. Patient complains of stress and depression.  Denies rectal bleeding.  01/04/2017 Patient says she feels okay. She complains of abdominal cramping, excess noise and bloating. She also complains of constipation with one bowel movement every 4 days. She believes it is because of Lyrica. She is on Dulcolax and Miralax. Denies diarrhea. She says the Dicyclomine leaves a weird taste in her mouth.  02/14/2017 Colonoscopy done 01/31/17 showed normal colonic mucosa, diverticulosis and poor prep. No coltis noted. EGD done on 01/31/17 showed schatzki's ring. Status post dilatation. Gastritis and duodenitis noted. Biopsies were normal for both procedures. Patient is doing well. Denies abdominal pain and rectal bleeding. She is currently on Trazadone, Pentasa, Lyrica and Bentyl. She complains of gout.  05/17/2017 Patient complains of constipation with one bowel movement every 2-3 days. She says she feels like she always needs to have a bowel movement but says she can't. She also complains of straining while trying to have a bowel movement, bloating and lower abdominal pain. Patient is currently on Bentyl tid. She is no longer on Pentasa. Denies any new changes in eating habits.  09/19/2017 Labwork done 05/17/17 showed normal thyroid levels, hemoglobin and liver enzymes. Patient is doing much better. She was recently and was found to have elevated Potassium levels. She admits to occasional lower abdominal pain and change in bowel habits of constipation followed by diarrhea. She was unable to fill Linzess as insurance wouldn't afford it and she could not afford it so she is currently on Miralax. Patient is currently on Bentyl tid. Denies abdominal pain and nausea.  12/08/2017 Labwork done by Dr. Pagan showed slight anemia with a hemoglobin of 10. She is currently on oral iron supplementation and she says she has been on supplementation for a long time. Patient admits to a change in bowel habits with bouts of constipation followed by diarrhea. She also admits to occasional weakness and occasional acid reflux but says it is not often. Denies abdominal pain. Colonoscopy done in 01/2017 was normal and EGD done in 01/2017 showed gastritis.  07/11/2018 Patient admits to occasional cramping and bloating. States she has occasional constipation, but no symptoms in the past 2 weeks. Patient takes iron supplementations. Patient takes Protonix and dicyclomine. Patient admits to occasional fatigue. States she was diagnosed with gout. Symptoms improved. No melena or hematochezia.  1/16/2019 Admits to occasional constipation, has bowel movement every 2-3 days. Currently not on any medication for constipation.  Admits to occasional bloating and abdominal pain. States Linzess not convered by insurance.  Patient states she is still taking Protonix.  4/17/2019 Patient states she saw the NP at Dr. Pagan's office, for frequent nosebleeds. She was never referred to an ENT doctor. She only had labwork done, and was told she had WBC's in her urine. She states Linzess helps her have a bowel movement 3 times a day. She denies any heartburn, states she has acid reflux occasionally. She is not on any medication for acid reflux. She denies any Crohn's flare up recently. Last colonoscopy was done in 2017. She states everything is much better now. She denies any melena or hematochezia.  10/16/2019 Patient states she is still having constipation. She states she has bowel movements every 2 days. She is currently on Linzess and says some days it works and other days it doesn't. She states Linzess occasionally gives her loose bowels. She denies any heartburn or acid reflux. She is currently on Protonix 40 mg po qd.  She states neuropathy is bothering her in her legs and feet. Previous colonoscopy done at the hospital in 2017, showed poor prep. Denies any melena or hematochezia.  02/19/2020 Patient complains of abdominal pain in the lower abdomen, for a couple of weeks and continues to worsen. She does report constipation. She is taking Trulance, which worked well in the beginning and then stopped having bowel movements, until every 4 days. She states Miralax worked for her. She states Linzess did not work for her either. She has never tried Amitiza in the past. Denies any fever or chills. She states abdominal pain improved, not as bad as before. She states feeling that the lining of the abdomen feels raw. She continues to take dicyclomine daily with no improvement.  04/17/2020 I called the patient. She complains of continued problems with her bowels. She still has symptoms of constipation, with bowel movements every 4-5 days. She was taking Amitiza 8 mcg po bid, which was helping for a while. She denies any bloating, but does report abdominal cramping. Patient is complaining of constipation and bloating.  She also complains of occasional heartburn

## 2022-10-19 NOTE — HPI-TODAY'S VISIT:
11/11/2020 Patient presents for office follow up visit. She reports some occasional constipation. She states Amitiza po bid, helped better than Linzess and Trulance. She is having bowel movements every two days. She has mild acid reflux. She has recently started Percocet, but stopped taking it as it hasn't helped with her neck pain.  05/12/2021 Patient presents for a follow up office visit. Colonoscopy on 01/31/2017 showed normal colonic mucosa. Random colon biopsies were normal in 2017. Patient reports occasional abdominal cramping, gas and bloating, mainly when she eats ice cream. She currently takes Amitiza 24mcg twice a day which is helping her constipation. She also takes pantoprazole 40mg and Levsin. She has stopped Pentasa as of 3 years ago. She currently denies any acid reflux symptoms.   11/17/2021 Patient is presently hypotensive (75/45). She states she has been experiencing leg weakness, general fatigue, and dizziness. She sustained a fall on Monday and injured her right knee. Patient denies any changes in bowel habits.  06/14/2022 Patient presents for IBS and acid reflux. Patient takes amitiza but says that she still has constipation. Patient states that linzess did not help her and that the amitiza helps her more. Patient states the acid reflux is okay. Patient states that she fell around Thanksgiving and that she had developed a brain bleed. The patient states that it was a very bad pain. She also states that she went to the hospital in January due to very bad blood pressure drop. The last time the patient went to the hospital was in March for leg pain and said she had clots. She says that she is not able to walk very well. Patient says she gets abdominal pain at times.  10/19/2022 Patient presents for a follow up. Patient is anemic. Patient states that she started coughing bad this morning and that she woke up with her left side eye shut and that it was hard to open. She also states that she has red eyes and blurred vision. She denies melena and hematochezia. Patient states that she takes xeralto for blood clots in her legs and that her last blood clot was in February.

## 2022-10-20 PROBLEM — 87522002: Status: ACTIVE | Noted: 2022-10-19

## 2022-11-10 ENCOUNTER — TELEPHONE ENCOUNTER (OUTPATIENT)
Dept: URBAN - METROPOLITAN AREA CLINIC 82 | Facility: CLINIC | Age: 67
End: 2022-11-10

## 2022-11-10 RX ORDER — LUBIPROSTONE 24 UG/1
1 CAPSULE WITH FOOD AND WATER CAPSULE, GELATIN COATED ORAL TWICE A DAY
Qty: 180 | Refills: 0
End: 2023-02-08

## 2022-12-02 ENCOUNTER — OFFICE VISIT (OUTPATIENT)
Dept: URBAN - METROPOLITAN AREA SURGERY CENTER 13 | Facility: SURGERY CENTER | Age: 67
End: 2022-12-02

## 2022-12-06 ENCOUNTER — TELEPHONE ENCOUNTER (OUTPATIENT)
Dept: URBAN - METROPOLITAN AREA CLINIC 82 | Facility: CLINIC | Age: 67
End: 2022-12-06

## 2022-12-06 RX ORDER — POLYETHYLENE GLYCOL-3350 AND ELECTROLYTES WITH FLAVOR PACK 240; 5.84; 2.98; 6.72; 22.72 G/278.26G; G/278.26G; G/278.26G; G/278.26G; G/278.26G
AS DIRECTED POWDER, FOR SOLUTION ORAL
Qty: 1 | Refills: 0 | OUTPATIENT
Start: 2022-12-08 | End: 2022-12-09

## 2023-01-06 ENCOUNTER — TELEPHONE ENCOUNTER (OUTPATIENT)
Dept: URBAN - METROPOLITAN AREA CLINIC 115 | Facility: CLINIC | Age: 68
End: 2023-01-06

## 2023-01-06 RX ORDER — PANTOPRAZOLE SODIUM 40 MG/1
1 TABLET TABLET, DELAYED RELEASE ORAL ONCE A DAY
Qty: 90 | Refills: 1
Start: 2021-05-12

## 2023-01-17 ENCOUNTER — OFFICE VISIT (OUTPATIENT)
Dept: URBAN - METROPOLITAN AREA MEDICAL CENTER 24 | Facility: MEDICAL CENTER | Age: 68
End: 2023-01-17
Payer: COMMERCIAL

## 2023-01-17 DIAGNOSIS — K22.2 ACQUIRED ESOPHAGEAL RING: ICD-10-CM

## 2023-01-17 DIAGNOSIS — K50.10 CC (CROHN'S COLITIS): ICD-10-CM

## 2023-01-17 DIAGNOSIS — K63.89 BACTERIAL OVERGROWTH SYNDROME: ICD-10-CM

## 2023-01-17 DIAGNOSIS — K29.60 ADENOPAPILLOMATOSIS GASTRICA: ICD-10-CM

## 2023-01-17 DIAGNOSIS — K21.00 ALKALINE REFLUX ESOPHAGITIS: ICD-10-CM

## 2023-01-17 PROCEDURE — 45380 COLONOSCOPY AND BIOPSY: CPT | Performed by: INTERNAL MEDICINE

## 2023-01-17 PROCEDURE — 43239 EGD BIOPSY SINGLE/MULTIPLE: CPT | Performed by: INTERNAL MEDICINE

## 2023-02-28 ENCOUNTER — DASHBOARD ENCOUNTERS (OUTPATIENT)
Age: 68
End: 2023-02-28

## 2023-03-01 ENCOUNTER — OFFICE VISIT (OUTPATIENT)
Dept: URBAN - METROPOLITAN AREA CLINIC 82 | Facility: CLINIC | Age: 68
End: 2023-03-01

## 2023-03-01 RX ORDER — METOPROLOL SUCCINATE 50 MG/1
TABLET, EXTENDED RELEASE ORAL
Qty: 0 | Refills: 0 | Status: ACTIVE | COMMUNITY
Start: 1900-01-01

## 2023-03-01 RX ORDER — WARFARIN 4 MG/1
TAKE 1 TABLET (4 MG) BY ORAL ROUTE ONCE DAILY TABLET ORAL 1
Qty: 0 | Refills: 0 | Status: ACTIVE | COMMUNITY
Start: 1900-01-01

## 2023-03-01 RX ORDER — FUROSEMIDE 40 MG/1
TABLET ORAL
Qty: 0 | Refills: 0 | Status: ACTIVE | COMMUNITY
Start: 1900-01-01

## 2023-03-01 RX ORDER — ESCITALOPRAM 20 MG/1
TAKE 1 TABLET (20 MG) BY ORAL ROUTE ONCE DAILY TABLET, FILM COATED ORAL 1
Qty: 0 | Refills: 0 | Status: ACTIVE | COMMUNITY
Start: 1900-01-01

## 2023-03-01 RX ORDER — FOLIC ACID 1 MG/1
TAKE 1 TABLET (1 MG) BY ORAL ROUTE ONCE DAILY TABLET ORAL 1
Qty: 0 | Refills: 0 | Status: ACTIVE | COMMUNITY
Start: 1900-01-01

## 2023-03-01 RX ORDER — CYANOCOBALAMIN (VITAMIN B-12) 500 MCG
TABLET ORAL
Qty: 0 | Refills: 0 | Status: ACTIVE | COMMUNITY
Start: 1900-01-01

## 2023-03-01 RX ORDER — BIOTIN 2500 MCG
TAKE 1 CAPSULE BY ORAL ROUTE DAILY CAPSULE ORAL 1
Qty: 0 | Refills: 0 | Status: ACTIVE | COMMUNITY
Start: 1900-01-01

## 2023-03-01 RX ORDER — PREGABALIN 200 MG
TAKE 1 CAPSULE (200 MG) BY ORAL ROUTE 2 TIMES PER DAY CAPSULE ORAL 2
Qty: 0 | Refills: 0 | Status: ACTIVE | COMMUNITY
Start: 1900-01-01

## 2023-03-01 RX ORDER — RANOLAZINE 500 MG/1
TAKE 1 TABLET (500 MG) BY ORAL ROUTE 2 TIMES PER DAY TABLET, FILM COATED, EXTENDED RELEASE ORAL 2
Qty: 0 | Refills: 0 | Status: ACTIVE | COMMUNITY
Start: 1900-01-01

## 2023-03-01 RX ORDER — HYDROXYZINE HYDROCHLORIDE 10 MG/1
TAKE 1 TABLET BY ORAL ROUTE 2 TIMES A DAY TABLET ORAL 2
Qty: 0 | Refills: 0 | Status: ACTIVE | COMMUNITY
Start: 1900-01-01

## 2023-03-01 RX ORDER — TRAZODONE HYDROCHLORIDE 100 MG/1
TAKE 1 TABLET (100 MG) BY ORAL ROUTE ONCE DAILY AT BEDTIME TABLET, FILM COATED ORAL 1
Qty: 0 | Refills: 0 | Status: ACTIVE | COMMUNITY
Start: 1900-01-01

## 2023-03-01 RX ORDER — VERAPAMIL HYDROCHLORIDE 180 MG/1
TAKE 1 TABLET (180 MG) BY ORAL ROUTE ONCE DAILY WITH FOOD TABLET ORAL 1
Qty: 0 | Refills: 0 | Status: ACTIVE | COMMUNITY
Start: 1900-01-01

## 2023-03-01 RX ORDER — PANTOPRAZOLE SODIUM 40 MG/1
1 TABLET TABLET, DELAYED RELEASE ORAL ONCE A DAY
Qty: 90 | Refills: 1 | Status: ACTIVE | COMMUNITY
Start: 2022-06-14

## 2023-03-01 RX ORDER — ATENOLOL 25 MG/1
TAKE 1 TABLET (25 MG) BY ORAL ROUTE ONCE DAILY TABLET ORAL 1
Qty: 0 | Refills: 0 | Status: ACTIVE | COMMUNITY
Start: 1900-01-01

## 2023-03-01 RX ORDER — PANTOPRAZOLE SODIUM 40 MG/1
1 TABLET TABLET, DELAYED RELEASE ORAL ONCE A DAY
Qty: 90 | Refills: 1 | Status: ACTIVE | COMMUNITY
Start: 2021-05-12

## 2023-03-01 RX ORDER — NITROGLYCERIN 0.4 MG/1
PLACE 1 TABLET (0.4 MG) BY BUCCAL ROUTE AT THE FIRST SIGN OF AN ATTACK; NO MORE THAN 3 TABS ARE RECOMMENDED WITHIN A 15 MINUTE PERIOD TABLET SUBLINGUAL
Qty: 1 | Refills: 0 | Status: ACTIVE | COMMUNITY
Start: 1900-01-01

## 2023-03-01 RX ORDER — WARFARIN SODIUM 1 MG/1
TAKE 1 TABLET (1 MG) BY ORAL ROUTE ONCE DAILY TABLET ORAL 1
Qty: 0 | Refills: 0 | Status: ACTIVE | COMMUNITY
Start: 1900-01-01

## 2023-03-01 RX ORDER — FENTANYL 25 UG/H
APPLY 1 PATCH (25 MCG/HOUR) BY TRANSDERMAL ROUTE EVERY 72 HOURS PATCH, EXTENDED RELEASE TRANSDERMAL
Qty: 0 | Refills: 0 | Status: ACTIVE | COMMUNITY
Start: 1900-01-01

## 2023-03-01 RX ORDER — FUROSEMIDE 40 MG/1
TAKE 1 TABLET (40 MG) BY ORAL ROUTE ONCE DAILY TABLET ORAL 1
Qty: 0 | Refills: 0 | Status: ACTIVE | COMMUNITY
Start: 1900-01-01

## 2023-03-01 RX ORDER — PANTOPRAZOLE SODIUM 40 MG/1
1 TABLET TABLET, DELAYED RELEASE ORAL ONCE A DAY
Qty: 90 TABLET | Refills: 1 | Status: ACTIVE | COMMUNITY

## 2023-04-24 ENCOUNTER — TELEPHONE ENCOUNTER (OUTPATIENT)
Dept: URBAN - METROPOLITAN AREA CLINIC 82 | Facility: CLINIC | Age: 68
End: 2023-04-24

## 2023-04-24 RX ORDER — SUCRALFATE 1 G/1
1 TABLET ON AN EMPTY STOMACH TABLET ORAL
Qty: 30 | Refills: 1 | OUTPATIENT
Start: 2023-04-24 | End: 2023-06-23

## 2023-06-07 ENCOUNTER — TELEPHONE ENCOUNTER (OUTPATIENT)
Dept: URBAN - METROPOLITAN AREA CLINIC 82 | Facility: CLINIC | Age: 68
End: 2023-06-07

## 2023-06-07 RX ORDER — LUBIPROSTONE 24 UG/1
1 CAPSULE WITH FOOD AND WATER CAPSULE, GELATIN COATED ORAL TWICE A DAY
Qty: 180 CAPSULE | Refills: 1 | OUTPATIENT
Start: 2023-06-07 | End: 2023-12-03

## 2023-07-07 ENCOUNTER — TELEPHONE ENCOUNTER (OUTPATIENT)
Dept: URBAN - METROPOLITAN AREA CLINIC 115 | Facility: CLINIC | Age: 68
End: 2023-07-07

## 2023-07-07 RX ORDER — PANTOPRAZOLE SODIUM 40 MG/1
1 TABLET TABLET, DELAYED RELEASE ORAL ONCE A DAY
Qty: 90 | Refills: 1
Start: 2022-06-14

## 2024-01-05 ENCOUNTER — TELEPHONE ENCOUNTER (OUTPATIENT)
Dept: URBAN - METROPOLITAN AREA CLINIC 82 | Facility: CLINIC | Age: 69
End: 2024-01-05

## 2024-01-05 RX ORDER — LUBIPROSTONE 24 UG/1
1 CAPSULE WITH FOOD AND WATER CAPSULE, GELATIN COATED ORAL TWICE A DAY
Qty: 180 CAPSULE | Refills: 1 | OUTPATIENT
Start: 2024-01-05 | End: 2024-07-03

## 2024-01-18 ENCOUNTER — TELEPHONE ENCOUNTER (OUTPATIENT)
Dept: URBAN - METROPOLITAN AREA CLINIC 115 | Facility: CLINIC | Age: 69
End: 2024-01-18

## 2024-01-18 RX ORDER — PANTOPRAZOLE SODIUM 40 MG/1
1 TABLET TABLET, DELAYED RELEASE ORAL ONCE A DAY
Qty: 90 | Refills: 1
Start: 2022-06-14

## 2024-05-28 ENCOUNTER — TELEPHONE ENCOUNTER (OUTPATIENT)
Dept: URBAN - METROPOLITAN AREA CLINIC 82 | Facility: CLINIC | Age: 69
End: 2024-05-28

## 2024-06-10 ENCOUNTER — TELEPHONE ENCOUNTER (OUTPATIENT)
Dept: URBAN - METROPOLITAN AREA CLINIC 115 | Facility: CLINIC | Age: 69
End: 2024-06-10

## 2024-06-17 ENCOUNTER — TELEPHONE ENCOUNTER (OUTPATIENT)
Dept: URBAN - METROPOLITAN AREA CLINIC 115 | Facility: CLINIC | Age: 69
End: 2024-06-17

## 2024-07-12 ENCOUNTER — TELEPHONE ENCOUNTER (OUTPATIENT)
Dept: URBAN - METROPOLITAN AREA CLINIC 115 | Facility: CLINIC | Age: 69
End: 2024-07-12

## 2024-07-12 RX ORDER — PANTOPRAZOLE SODIUM 40 MG/1
1 TABLET TABLET, DELAYED RELEASE ORAL ONCE A DAY
Qty: 90 | Refills: 1
Start: 2022-06-14

## 2024-12-05 ENCOUNTER — TELEPHONE ENCOUNTER (OUTPATIENT)
Dept: URBAN - METROPOLITAN AREA CLINIC 82 | Facility: CLINIC | Age: 69
End: 2024-12-05

## 2024-12-05 RX ORDER — LUBIPROSTONE 24 UG/1
1 CAPSULE WITH FOOD AND WATER CAPSULE, GELATIN COATED ORAL TWICE A DAY
Qty: 180 CAPSULE | Refills: 1 | OUTPATIENT
Start: 2024-12-05 | End: 2025-06-03

## 2024-12-18 ENCOUNTER — OFFICE VISIT (OUTPATIENT)
Dept: URBAN - METROPOLITAN AREA CLINIC 82 | Facility: CLINIC | Age: 69
End: 2024-12-18
Payer: COMMERCIAL

## 2024-12-18 VITALS
DIASTOLIC BLOOD PRESSURE: 56 MMHG | SYSTOLIC BLOOD PRESSURE: 87 MMHG | HEART RATE: 94 BPM | WEIGHT: 195.4 LBS | BODY MASS INDEX: 31.4 KG/M2 | HEIGHT: 66 IN | TEMPERATURE: 98.2 F

## 2024-12-18 DIAGNOSIS — K50.90 CROHN DISEASE: ICD-10-CM

## 2024-12-18 DIAGNOSIS — I95.9 HYPOTENSION, UNSPECIFIED HYPOTENSION TYPE: ICD-10-CM

## 2024-12-18 DIAGNOSIS — D50.9 IRON DEFICIENCY ANEMIA, UNSPECIFIED IRON DEFICIENCY ANEMIA TYPE: ICD-10-CM

## 2024-12-18 DIAGNOSIS — R10.84 ABDOMINAL PAIN, GENERALIZED: ICD-10-CM

## 2024-12-18 DIAGNOSIS — K59.00 CONSTIPATION: ICD-10-CM

## 2024-12-18 PROCEDURE — 99214 OFFICE O/P EST MOD 30 MIN: CPT | Performed by: INTERNAL MEDICINE

## 2024-12-18 RX ORDER — FUROSEMIDE 40 MG/1
TABLET ORAL
Qty: 0 | Refills: 0 | Status: ACTIVE | COMMUNITY
Start: 1900-01-01

## 2024-12-18 RX ORDER — VERAPAMIL HYDROCHLORIDE 180 MG/1
TAKE 1 TABLET (180 MG) BY ORAL ROUTE ONCE DAILY WITH FOOD TABLET ORAL 1
Qty: 0 | Refills: 0 | Status: ACTIVE | COMMUNITY
Start: 1900-01-01

## 2024-12-18 RX ORDER — FUROSEMIDE 40 MG/1
TAKE 1 TABLET (40 MG) BY ORAL ROUTE ONCE DAILY TABLET ORAL 1
Qty: 0 | Refills: 0 | Status: ACTIVE | COMMUNITY
Start: 1900-01-01

## 2024-12-18 RX ORDER — FENTANYL 25 UG/H
APPLY 1 PATCH (25 MCG/HOUR) BY TRANSDERMAL ROUTE EVERY 72 HOURS PATCH, EXTENDED RELEASE TRANSDERMAL
Qty: 0 | Refills: 0 | Status: ACTIVE | COMMUNITY
Start: 1900-01-01

## 2024-12-18 RX ORDER — RANOLAZINE 500 MG/1
TAKE 1 TABLET (500 MG) BY ORAL ROUTE 2 TIMES PER DAY TABLET, FILM COATED, EXTENDED RELEASE ORAL 2
Qty: 0 | Refills: 0 | Status: ACTIVE | COMMUNITY
Start: 1900-01-01

## 2024-12-18 RX ORDER — LUBIPROSTONE 24 UG/1
1 CAPSULE WITH FOOD AND WATER CAPSULE, GELATIN COATED ORAL TWICE A DAY
Qty: 180 CAPSULE | Refills: 1 | OUTPATIENT
Start: 2024-12-18 | End: 2025-06-16

## 2024-12-18 RX ORDER — BIOTIN 2500 MCG
TAKE 1 CAPSULE BY ORAL ROUTE DAILY CAPSULE ORAL 1
Qty: 0 | Refills: 0 | Status: ACTIVE | COMMUNITY
Start: 1900-01-01

## 2024-12-18 RX ORDER — CYANOCOBALAMIN (VITAMIN B-12) 500 MCG
TABLET ORAL
Qty: 0 | Refills: 0 | Status: ACTIVE | COMMUNITY
Start: 1900-01-01

## 2024-12-18 RX ORDER — PANTOPRAZOLE SODIUM 40 MG/1
1 TABLET TABLET, DELAYED RELEASE ORAL ONCE A DAY
Qty: 90 | Refills: 1 | Status: DISCONTINUED | COMMUNITY
Start: 2022-06-14

## 2024-12-18 RX ORDER — ATENOLOL 25 MG/1
TAKE 1 TABLET (25 MG) BY ORAL ROUTE ONCE DAILY TABLET ORAL 1
Qty: 0 | Refills: 0 | Status: ACTIVE | COMMUNITY
Start: 1900-01-01

## 2024-12-18 RX ORDER — TRAZODONE HYDROCHLORIDE 100 MG/1
TAKE 1 TABLET (100 MG) BY ORAL ROUTE ONCE DAILY AT BEDTIME TABLET ORAL 1
Qty: 0 | Refills: 0 | Status: ACTIVE | COMMUNITY
Start: 1900-01-01

## 2024-12-18 RX ORDER — FOLIC ACID 1 MG/1
TAKE 1 TABLET (1 MG) BY ORAL ROUTE ONCE DAILY TABLET ORAL 1
Qty: 0 | Refills: 0 | Status: ACTIVE | COMMUNITY
Start: 1900-01-01

## 2024-12-18 RX ORDER — METOPROLOL SUCCINATE 50 MG/1
TABLET, EXTENDED RELEASE ORAL
Qty: 0 | Refills: 0 | Status: ACTIVE | COMMUNITY
Start: 1900-01-01

## 2024-12-18 RX ORDER — PANTOPRAZOLE SODIUM 40 MG/1
1 TABLET TABLET, DELAYED RELEASE ORAL ONCE A DAY
Qty: 90 TABLET | Refills: 1 | Status: ACTIVE | COMMUNITY

## 2024-12-18 RX ORDER — PANTOPRAZOLE SODIUM 40 MG/1
1 TABLET TABLET, DELAYED RELEASE ORAL ONCE A DAY
Qty: 90 | Refills: 1 | OUTPATIENT
Start: 2024-12-18

## 2024-12-18 RX ORDER — NITROGLYCERIN 0.4 MG/1
PLACE 1 TABLET (0.4 MG) BY BUCCAL ROUTE AT THE FIRST SIGN OF AN ATTACK; NO MORE THAN 3 TABS ARE RECOMMENDED WITHIN A 15 MINUTE PERIOD TABLET SUBLINGUAL
Qty: 1 | Refills: 0 | Status: ACTIVE | COMMUNITY
Start: 1900-01-01

## 2024-12-18 RX ORDER — PREGABALIN 200 MG
TAKE 1 CAPSULE (200 MG) BY ORAL ROUTE 2 TIMES PER DAY CAPSULE ORAL 2
Qty: 0 | Refills: 0 | Status: ACTIVE | COMMUNITY
Start: 1900-01-01

## 2024-12-18 RX ORDER — HYDROXYZINE HYDROCHLORIDE 10 MG/1
TAKE 1 TABLET BY ORAL ROUTE 2 TIMES A DAY TABLET ORAL 2
Qty: 0 | Refills: 0 | Status: ACTIVE | COMMUNITY
Start: 1900-01-01

## 2024-12-18 RX ORDER — ESCITALOPRAM 20 MG/1
TAKE 1 TABLET (20 MG) BY ORAL ROUTE ONCE DAILY TABLET, FILM COATED ORAL 1
Qty: 0 | Refills: 0 | Status: ACTIVE | COMMUNITY
Start: 1900-01-01

## 2024-12-18 RX ORDER — WARFARIN 4 MG/1
TAKE 1 TABLET (4 MG) BY ORAL ROUTE ONCE DAILY TABLET ORAL 1
Qty: 0 | Refills: 0 | Status: ACTIVE | COMMUNITY
Start: 1900-01-01

## 2024-12-18 RX ORDER — WARFARIN SODIUM 1 MG/1
TAKE 1 TABLET (1 MG) BY ORAL ROUTE ONCE DAILY TABLET ORAL 1
Qty: 0 | Refills: 0 | Status: ACTIVE | COMMUNITY
Start: 1900-01-01

## 2024-12-18 RX ORDER — LUBIPROSTONE 24 UG/1
1 CAPSULE WITH FOOD AND WATER CAPSULE, GELATIN COATED ORAL TWICE A DAY
Qty: 180 CAPSULE | Refills: 1 | Status: ACTIVE | COMMUNITY
Start: 2024-12-05 | End: 2025-06-03

## 2024-12-18 RX ORDER — PANTOPRAZOLE SODIUM 40 MG/1
1 TABLET TABLET, DELAYED RELEASE ORAL ONCE A DAY
Qty: 90 | Refills: 1 | Status: DISCONTINUED | COMMUNITY
Start: 2021-05-12

## 2024-12-18 NOTE — HPI-TODAY'S VISIT:
11/11/2020 Patient presents for office follow up visit. She reports some occasional constipation. She states Amitiza po bid, helped better than Linzess and Trulance. She is having bowel movements every two days. She has mild acid reflux. She has recently started Percocet, but stopped taking it as it hasn't helped with her neck pain.  05/12/2021 Patient presents for a follow up office visit. Colonoscopy on 01/31/2017 showed normal colonic mucosa. Random colon biopsies were normal in 2017. Patient reports occasional abdominal cramping, gas and bloating, mainly when she eats ice cream. She currently takes Amitiza 24mcg twice a day which is helping her constipation. She also takes pantoprazole 40mg and Levsin. She has stopped Pentasa as of 3 years ago. She currently denies any acid reflux symptoms.   11/17/2021 Patient is presently hypotensive (75/45). She states she has been experiencing leg weakness, general fatigue, and dizziness. She sustained a fall on Monday and injured her right knee. Patient denies any changes in bowel habits.  06/14/2022 Patient presents for IBS and acid reflux. Patient takes amitiza but says that she still has constipation. Patient states that linzess did not help her and that the amitiza helps her more. Patient states the acid reflux is okay. Patient states that she fell around Thanksgiving and that she had developed a brain bleed. The patient states that it was a very bad pain. She also states that she went to the hospital in January due to very bad blood pressure drop. The last time the patient went to the hospital was in March for leg pain and said she had clots. She says that she is not able to walk very well. Patient says she gets abdominal pain at times.  10/19/2022 Patient presents for a follow up. Patient is anemic. Patient states that she started coughing bad this morning and that she woke up with her left side eye shut and that it was hard to open. She also states that she has red eyes and blurred vision. She denies melena and hematochezia. Patient states that she takes xeralto for blood clots in her legs and that her last blood clot was in February.  12/18/2024 Patient presents for follow up. Patient has anemia and recently had labs [facility] showed her iron levels were low and PCP recommended infusions if her levels did not improve. Patient has bloating and abdominal pain intermittently when she uses whole milk. She says she occasionally has acid reflux. Patient has low blood pressure.

## 2024-12-18 NOTE — HPI-OTHER HISTORIES
This is a follow-up appointment for this patient, a 65 year old /Black female, after a previous visit on 11/12/2015, for an evaluation for medication refill . The patient is here to refill on her hyoscyamine since the pharmacy said that she has been feeling ok from a GI standpoint otherwise. She denies abdominal pain, diarrhea, or blood in the stool.         09/29/2016 Colonoscopy in 2014 did not show any active colitis. Patient complains of upper abdomen and infraumbilical area abdominal pain. She says it began monday night. Patient was admitted into [facility] on July 27, 2016. She says her bowels are fine but complains of severe pain. Patient discontinued Bentyl. She is still taking Protonix. Patient complains of stress and depression.  Denies rectal bleeding.  01/04/2017 Patient says she feels okay. She complains of abdominal cramping, excess noise and bloating. She also complains of constipation with one bowel movement every 4 days. She believes it is because of Lyrica. She is on Dulcolax and Miralax. Denies diarrhea. She says the Dicyclomine leaves a weird taste in her mouth.  02/14/2017 Colonoscopy done 01/31/17 showed normal colonic mucosa, diverticulosis and poor prep. No coltis noted. EGD done on 01/31/17 showed schatzki's ring. Status post dilatation. Gastritis and duodenitis noted. Biopsies were normal for both procedures. Patient is doing well. Denies abdominal pain and rectal bleeding. She is currently on Trazadone, Pentasa, Lyrica and Bentyl. She complains of gout.  05/17/2017 Patient complains of constipation with one bowel movement every 2-3 days. She says she feels like she always needs to have a bowel movement but says she can't. She also complains of straining while trying to have a bowel movement, bloating and lower abdominal pain. Patient is currently on Bentyl tid. She is no longer on Pentasa. Denies any new changes in eating habits.  09/19/2017 Labwork done 05/17/17 showed normal thyroid levels, hemoglobin and liver enzymes. Patient is doing much better. She was recently and was found to have elevated Potassium levels. She admits to occasional lower abdominal pain and change in bowel habits of constipation followed by diarrhea. She was unable to fill Linzess as insurance wouldn't afford it and she could not afford it so she is currently on Miralax. Patient is currently on Bentyl tid. Denies abdominal pain and nausea.  12/08/2017 Labwork done by Dr. Pagan showed slight anemia with a hemoglobin of 10. She is currently on oral iron supplementation and she says she has been on supplementation for a long time. Patient admits to a change in bowel habits with bouts of constipation followed by diarrhea. She also admits to occasional weakness and occasional acid reflux but says it is not often. Denies abdominal pain. Colonoscopy done in 01/2017 was normal and EGD done in 01/2017 showed gastritis.  07/11/2018 Patient admits to occasional cramping and bloating. States she has occasional constipation, but no symptoms in the past 2 weeks. Patient takes iron supplementations. Patient takes Protonix and dicyclomine. Patient admits to occasional fatigue. States she was diagnosed with gout. Symptoms improved. No melena or hematochezia.  1/16/2019 Admits to occasional constipation, has bowel movement every 2-3 days. Currently not on any medication for constipation.  Admits to occasional bloating and abdominal pain. States Linzess not convered by insurance.  Patient states she is still taking Protonix.  4/17/2019 Patient states she saw the NP at Dr. Pagan's office, for frequent nosebleeds. She was never referred to an ENT doctor. She only had labwork done, and was told she had WBC's in her urine. She states Linzess helps her have a bowel movement 3 times a day. She denies any heartburn, states she has acid reflux occasionally. She is not on any medication for acid reflux. She denies any Crohn's flare up recently. Last colonoscopy was done in 2017. She states everything is much better now. She denies any melena or hematochezia.  10/16/2019 Patient states she is still having constipation. She states she has bowel movements every 2 days. She is currently on Linzess and says some days it works and other days it doesn't. She states Linzess occasionally gives her loose bowels. She denies any heartburn or acid reflux. She is currently on Protonix 40 mg po qd.  She states neuropathy is bothering her in her legs and feet. Previous colonoscopy done at the hospital in 2017, showed poor prep. Denies any melena or hematochezia.  02/19/2020 Patient complains of abdominal pain in the lower abdomen, for a couple of weeks and continues to worsen. She does report constipation. She is taking Trulance, which worked well in the beginning and then stopped having bowel movements, until every 4 days. She states Miralax worked for her. She states Linzess did not work for her either. She has never tried Amitiza in the past. Denies any fever or chills. She states abdominal pain improved, not as bad as before. She states feeling that the lining of the abdomen feels raw. She continues to take dicyclomine daily with no improvement.  04/17/2020 I called the patient. She complains of continued problems with her bowels. She still has symptoms of constipation, with bowel movements every 4-5 days. She was taking Amitiza 8 mcg po bid, which was helping for a while. She denies any bloating, but does report abdominal cramping. Patient is complaining of constipation and bloating.  She also complains of occasional heartburn

## 2025-06-25 ENCOUNTER — ERX REFILL RESPONSE (OUTPATIENT)
Dept: URBAN - METROPOLITAN AREA CLINIC 82 | Facility: CLINIC | Age: 70
End: 2025-06-25

## 2025-06-25 RX ORDER — PANTOPRAZOLE SODIUM 40 MG/1
TAKE ONE TABLET BY MOUTH ONE TIME DAILY TABLET, DELAYED RELEASE ORAL
Qty: 90 TABLET | Refills: 1 | OUTPATIENT

## 2025-06-25 RX ORDER — PANTOPRAZOLE SODIUM 40 MG/1
1 TABLET TABLET, DELAYED RELEASE ORAL ONCE A DAY
Qty: 90 | Refills: 1 | OUTPATIENT

## 2025-07-11 ENCOUNTER — OFFICE VISIT (OUTPATIENT)
Dept: URBAN - METROPOLITAN AREA CLINIC 82 | Facility: CLINIC | Age: 70
End: 2025-07-11

## 2025-07-11 RX ORDER — FUROSEMIDE 40 MG/1
TABLET ORAL
Qty: 0 | Refills: 0 | Status: ACTIVE | COMMUNITY
Start: 1900-01-01

## 2025-07-11 RX ORDER — FENTANYL 25 UG/H
APPLY 1 PATCH (25 MCG/HOUR) BY TRANSDERMAL ROUTE EVERY 72 HOURS PATCH, EXTENDED RELEASE TRANSDERMAL
Qty: 0 | Refills: 0 | Status: ACTIVE | COMMUNITY
Start: 1900-01-01

## 2025-07-11 RX ORDER — WARFARIN SODIUM 1 MG/1
TAKE 1 TABLET (1 MG) BY ORAL ROUTE ONCE DAILY TABLET ORAL 1
Qty: 0 | Refills: 0 | Status: ACTIVE | COMMUNITY
Start: 1900-01-01

## 2025-07-11 RX ORDER — ATENOLOL 25 MG/1
TAKE 1 TABLET (25 MG) BY ORAL ROUTE ONCE DAILY TABLET ORAL 1
Qty: 0 | Refills: 0 | Status: ACTIVE | COMMUNITY
Start: 1900-01-01

## 2025-07-11 RX ORDER — FOLIC ACID 1 MG/1
TAKE 1 TABLET (1 MG) BY ORAL ROUTE ONCE DAILY TABLET ORAL 1
Qty: 0 | Refills: 0 | Status: ACTIVE | COMMUNITY
Start: 1900-01-01

## 2025-07-11 RX ORDER — CYANOCOBALAMIN (VITAMIN B-12) 500 MCG
TABLET ORAL
Qty: 0 | Refills: 0 | Status: ACTIVE | COMMUNITY
Start: 1900-01-01

## 2025-07-11 RX ORDER — PREGABALIN 200 MG
TAKE 1 CAPSULE (200 MG) BY ORAL ROUTE 2 TIMES PER DAY CAPSULE ORAL 2
Qty: 0 | Refills: 0 | Status: ACTIVE | COMMUNITY
Start: 1900-01-01

## 2025-07-11 RX ORDER — HYDROXYZINE HYDROCHLORIDE 10 MG/1
TAKE 1 TABLET BY ORAL ROUTE 2 TIMES A DAY TABLET ORAL 2
Qty: 0 | Refills: 0 | Status: ACTIVE | COMMUNITY
Start: 1900-01-01

## 2025-07-11 RX ORDER — FUROSEMIDE 40 MG/1
TAKE 1 TABLET (40 MG) BY ORAL ROUTE ONCE DAILY TABLET ORAL 1
Qty: 0 | Refills: 0 | Status: ACTIVE | COMMUNITY
Start: 1900-01-01

## 2025-07-11 RX ORDER — METOPROLOL SUCCINATE 50 MG/1
TABLET, EXTENDED RELEASE ORAL
Qty: 0 | Refills: 0 | Status: ACTIVE | COMMUNITY
Start: 1900-01-01

## 2025-07-11 RX ORDER — RANOLAZINE 500 MG/1
TAKE 1 TABLET (500 MG) BY ORAL ROUTE 2 TIMES PER DAY TABLET, FILM COATED, EXTENDED RELEASE ORAL 2
Qty: 0 | Refills: 0 | Status: ACTIVE | COMMUNITY
Start: 1900-01-01

## 2025-07-11 RX ORDER — TRAZODONE HYDROCHLORIDE 100 MG/1
TAKE 1 TABLET (100 MG) BY ORAL ROUTE ONCE DAILY AT BEDTIME TABLET ORAL 1
Qty: 0 | Refills: 0 | Status: ACTIVE | COMMUNITY
Start: 1900-01-01

## 2025-07-11 RX ORDER — VERAPAMIL HYDROCHLORIDE 180 MG/1
TAKE 1 TABLET (180 MG) BY ORAL ROUTE ONCE DAILY WITH FOOD TABLET ORAL 1
Qty: 0 | Refills: 0 | Status: ACTIVE | COMMUNITY
Start: 1900-01-01

## 2025-07-11 RX ORDER — NITROGLYCERIN 0.4 MG/1
PLACE 1 TABLET (0.4 MG) BY BUCCAL ROUTE AT THE FIRST SIGN OF AN ATTACK; NO MORE THAN 3 TABS ARE RECOMMENDED WITHIN A 15 MINUTE PERIOD TABLET SUBLINGUAL
Qty: 1 | Refills: 0 | Status: ACTIVE | COMMUNITY
Start: 1900-01-01

## 2025-07-11 RX ORDER — BIOTIN 2500 MCG
TAKE 1 CAPSULE BY ORAL ROUTE DAILY CAPSULE ORAL 1
Qty: 0 | Refills: 0 | Status: ACTIVE | COMMUNITY
Start: 1900-01-01

## 2025-07-11 RX ORDER — PANTOPRAZOLE SODIUM 40 MG/1
TAKE ONE TABLET BY MOUTH ONE TIME DAILY TABLET, DELAYED RELEASE ORAL
Qty: 90 TABLET | Refills: 1 | Status: ACTIVE | COMMUNITY

## 2025-07-11 RX ORDER — ESCITALOPRAM 20 MG/1
TAKE 1 TABLET (20 MG) BY ORAL ROUTE ONCE DAILY TABLET, FILM COATED ORAL 1
Qty: 0 | Refills: 0 | Status: ACTIVE | COMMUNITY
Start: 1900-01-01

## 2025-07-11 RX ORDER — WARFARIN 4 MG/1
TAKE 1 TABLET (4 MG) BY ORAL ROUTE ONCE DAILY TABLET ORAL 1
Qty: 0 | Refills: 0 | Status: ACTIVE | COMMUNITY
Start: 1900-01-01

## 2025-07-18 ENCOUNTER — OFFICE VISIT (OUTPATIENT)
Dept: URBAN - METROPOLITAN AREA CLINIC 82 | Facility: CLINIC | Age: 70
End: 2025-07-18
Payer: COMMERCIAL

## 2025-07-18 DIAGNOSIS — K50.90 CROHN DISEASE: ICD-10-CM

## 2025-07-18 DIAGNOSIS — I95.9 HYPOTENSION, UNSPECIFIED HYPOTENSION TYPE: ICD-10-CM

## 2025-07-18 DIAGNOSIS — K59.00 CONSTIPATION: ICD-10-CM

## 2025-07-18 DIAGNOSIS — R14.0 BLOATING AND GAS: ICD-10-CM

## 2025-07-18 DIAGNOSIS — D50.9 IRON DEFICIENCY ANEMIA, UNSPECIFIED IRON DEFICIENCY ANEMIA TYPE: ICD-10-CM

## 2025-07-18 PROCEDURE — 99214 OFFICE O/P EST MOD 30 MIN: CPT | Performed by: INTERNAL MEDICINE

## 2025-07-18 RX ORDER — CYANOCOBALAMIN (VITAMIN B-12) 500 MCG
TABLET ORAL
Qty: 0 | Refills: 0 | Status: ACTIVE | COMMUNITY
Start: 1900-01-01

## 2025-07-18 RX ORDER — ESCITALOPRAM 20 MG/1
TAKE 1 TABLET (20 MG) BY ORAL ROUTE ONCE DAILY TABLET, FILM COATED ORAL 1
Qty: 0 | Refills: 0 | Status: ACTIVE | COMMUNITY
Start: 1900-01-01

## 2025-07-18 RX ORDER — FUROSEMIDE 40 MG/1
TABLET ORAL
Qty: 0 | Refills: 0 | Status: ACTIVE | COMMUNITY
Start: 1900-01-01

## 2025-07-18 RX ORDER — PANTOPRAZOLE SODIUM 40 MG/1
TAKE ONE TABLET BY MOUTH ONE TIME DAILY TABLET, DELAYED RELEASE ORAL
Qty: 90 TABLET | Refills: 1 | Status: ACTIVE | COMMUNITY

## 2025-07-18 RX ORDER — FOLIC ACID 1 MG/1
TAKE 1 TABLET (1 MG) BY ORAL ROUTE ONCE DAILY TABLET ORAL 1
Qty: 0 | Refills: 0 | Status: ACTIVE | COMMUNITY
Start: 1900-01-01

## 2025-07-18 RX ORDER — TRAZODONE HYDROCHLORIDE 100 MG/1
TAKE 1 TABLET (100 MG) BY ORAL ROUTE ONCE DAILY AT BEDTIME TABLET ORAL 1
Qty: 0 | Refills: 0 | Status: ACTIVE | COMMUNITY
Start: 1900-01-01

## 2025-07-18 RX ORDER — METOPROLOL SUCCINATE 50 MG/1
TABLET, EXTENDED RELEASE ORAL
Qty: 0 | Refills: 0 | Status: ACTIVE | COMMUNITY
Start: 1900-01-01

## 2025-07-18 RX ORDER — RANOLAZINE 500 MG/1
TAKE 1 TABLET (500 MG) BY ORAL ROUTE 2 TIMES PER DAY TABLET, FILM COATED, EXTENDED RELEASE ORAL 2
Qty: 0 | Refills: 0 | Status: ACTIVE | COMMUNITY
Start: 1900-01-01

## 2025-07-18 RX ORDER — FUROSEMIDE 40 MG/1
TAKE 1 TABLET (40 MG) BY ORAL ROUTE ONCE DAILY TABLET ORAL 1
Qty: 0 | Refills: 0 | Status: ACTIVE | COMMUNITY
Start: 1900-01-01

## 2025-07-18 RX ORDER — FENTANYL 25 UG/H
APPLY 1 PATCH (25 MCG/HOUR) BY TRANSDERMAL ROUTE EVERY 72 HOURS PATCH, EXTENDED RELEASE TRANSDERMAL
Qty: 0 | Refills: 0 | Status: ACTIVE | COMMUNITY
Start: 1900-01-01

## 2025-07-18 RX ORDER — HYDROXYZINE HYDROCHLORIDE 10 MG/1
TAKE 1 TABLET BY ORAL ROUTE 2 TIMES A DAY TABLET ORAL 2
Qty: 0 | Refills: 0 | Status: ACTIVE | COMMUNITY
Start: 1900-01-01

## 2025-07-18 RX ORDER — WARFARIN SODIUM 1 MG/1
TAKE 1 TABLET (1 MG) BY ORAL ROUTE ONCE DAILY TABLET ORAL 1
Qty: 0 | Refills: 0 | Status: ACTIVE | COMMUNITY
Start: 1900-01-01

## 2025-07-18 RX ORDER — VERAPAMIL HYDROCHLORIDE 180 MG/1
TAKE 1 TABLET (180 MG) BY ORAL ROUTE ONCE DAILY WITH FOOD TABLET ORAL 1
Qty: 0 | Refills: 0 | Status: ACTIVE | COMMUNITY
Start: 1900-01-01

## 2025-07-18 RX ORDER — NITROGLYCERIN 0.4 MG/1
PLACE 1 TABLET (0.4 MG) BY BUCCAL ROUTE AT THE FIRST SIGN OF AN ATTACK; NO MORE THAN 3 TABS ARE RECOMMENDED WITHIN A 15 MINUTE PERIOD TABLET SUBLINGUAL
Qty: 1 | Refills: 0 | Status: ACTIVE | COMMUNITY
Start: 1900-01-01

## 2025-07-18 RX ORDER — PREGABALIN 200 MG
TAKE 1 CAPSULE (200 MG) BY ORAL ROUTE 2 TIMES PER DAY CAPSULE ORAL 2
Qty: 0 | Refills: 0 | Status: ACTIVE | COMMUNITY
Start: 1900-01-01

## 2025-07-18 RX ORDER — ATENOLOL 25 MG/1
TAKE 1 TABLET (25 MG) BY ORAL ROUTE ONCE DAILY TABLET ORAL 1
Qty: 0 | Refills: 0 | Status: ACTIVE | COMMUNITY
Start: 1900-01-01

## 2025-07-18 RX ORDER — WARFARIN 4 MG/1
TAKE 1 TABLET (4 MG) BY ORAL ROUTE ONCE DAILY TABLET ORAL 1
Qty: 0 | Refills: 0 | Status: ACTIVE | COMMUNITY
Start: 1900-01-01

## 2025-07-18 RX ORDER — BIOTIN 2500 MCG
TAKE 1 CAPSULE BY ORAL ROUTE DAILY CAPSULE ORAL 1
Qty: 0 | Refills: 0 | Status: ACTIVE | COMMUNITY
Start: 1900-01-01

## 2025-07-18 NOTE — HPI-OTHER HISTORIES
[address]  [address] [facility] [place], [address] [facility]   (ph) [phone] (fax) [phone]  Clemencia KUMAR, ZIP-C      Chief Complaint   Patient presents with    New Patient    Establish Care    Other     Pt been sick on and off for the last 4 months (went to [place]), symptoms cough, neg for covid       30 yo female comes in c/o feeling sick on and off for four months. Pt c/o a lingering cough for four months. Pt reports having pneumonia in October and then pleurisy. She reports it has improved but she still feels it at times. She is also here to establish care with new provider. Other  Associated symptoms include arthralgias, coughing and fatigue.      No Known Allergies    Past Medical History:   Diagnosis Date    Anxiety     Depression        Family History   Problem Relation Age of Onset    Coronary Art Dis Father     Alzheimer's Disease Paternal Grandmother     Alzheimer's Disease Paternal Grandfather        Social History     Socioeconomic History    Marital status: Single     Spouse name: Not on file    Number of children: Not on file    Years of education: Not on file    Highest education level: Not on file   Occupational History    Not on file   Tobacco Use    Smoking status: Never    Smokeless tobacco: Never   Substance and Sexual Activity    Alcohol use: Not Currently     Alcohol/week: 1.0 standard drink     Types: 1 Drinks containing 0.5 oz of alcohol per week    Drug use: Yes     Types: Marijuana [place])    Sexual activity: Not on file   Other Topics Concern    Not on file   Social History Narrative    Not on file     Social Determinants of Health     Financial Resource Strain: Low Risk     Difficulty of Paying Living Expenses: Not hard at all   Food Insecurity: No Food Insecurity    Worried About Running Out of Food in the Last Year: Never true    Ran Out of Food in the Last Year: Never true   Transportation Needs: No Transportation Needs    Lack of This is a follow-up appointment for this patient, a 65 year old /Black female, after a previous visit on 11/12/2015, for an evaluation for medication refill . The patient is here to refill on her hyoscyamine since the pharmacy said that she has been feeling ok from a GI standpoint otherwise. She denies abdominal pain, diarrhea, or blood in the stool.         09/29/2016 Colonoscopy in 2014 did not show any active colitis. Patient complains of upper abdomen and infraumbilical area abdominal pain. She says it began monday night. Patient was admitted into [facility] on July 27, 2016. She says her bowels are fine but complains of severe pain. Patient discontinued Bentyl. She is still taking Protonix. Patient complains of stress and depression.  Denies rectal bleeding.  01/04/2017 Patient says she feels okay. She complains of abdominal cramping, excess noise and bloating. She also complains of constipation with one bowel movement every 4 days. She believes it is because of Lyrica. She is on Dulcolax and Miralax. Denies diarrhea. She says the Dicyclomine leaves a weird taste in her mouth.  02/14/2017 Colonoscopy done 01/31/17 showed normal colonic mucosa, diverticulosis and poor prep. No coltis noted. EGD done on 01/31/17 showed schatzki's ring. Status post dilatation. Gastritis and duodenitis noted. Biopsies were normal for both procedures. Patient is doing well. Denies abdominal pain and rectal bleeding. She is currently on Trazadone, Pentasa, Lyrica and Bentyl. She complains of gout.  05/17/2017 Patient complains of constipation with one bowel movement every 2-3 days. She says she feels like she always needs to have a bowel movement but says she can't. She also complains of straining while trying to have a bowel movement, bloating and lower abdominal pain. Patient is currently on Bentyl tid. She is no longer on Pentasa. Denies any new changes in eating habits.  09/19/2017 Labwork done 05/17/17 showed normal thyroid levels, hemoglobin and liver enzymes. Patient is doing much better. She was recently and was found to have elevated Potassium levels. She admits to occasional lower abdominal pain and change in bowel habits of constipation followed by diarrhea. She was unable to fill Linzess as insurance wouldn't afford it and she could not afford it so she is currently on Miralax. Patient is currently on Bentyl tid. Denies abdominal pain and nausea.  12/08/2017 Labwork done by Dr. Pagan showed slight anemia with a hemoglobin of 10. She is currently on oral iron supplementation and she says she has been on supplementation for a long time. Patient admits to a change in bowel habits with bouts of constipation followed by diarrhea. She also admits to occasional weakness and occasional acid reflux but says it is not often. Denies abdominal pain. Colonoscopy done in 01/2017 was normal and EGD done in 01/2017 showed gastritis.  07/11/2018 Patient admits to occasional cramping and bloating. States she has occasional constipation, but no symptoms in the past 2 weeks. Patient takes iron supplementations. Patient takes Protonix and dicyclomine. Patient admits to occasional fatigue. States she was diagnosed with gout. Symptoms improved. No melena or hematochezia.  1/16/2019 Admits to occasional constipation, has bowel movement every 2-3 days. Currently not on any medication for constipation.  Admits to occasional bloating and abdominal pain. States Linzess not convered by insurance.  Patient states she is still taking Protonix.  4/17/2019 Patient states she saw the NP at Dr. Pagan's office, for frequent nosebleeds. She was never referred to an ENT doctor. She only had labwork done, and was told she had WBC's in her urine. She states Linzess helps her have a bowel movement 3 times a day. She denies any heartburn, states she has acid reflux occasionally. She is not on any medication for acid reflux. She denies any Crohn's flare up recently. Last colonoscopy was done in 2017. She states everything is much better now. She denies any melena or hematochezia.  10/16/2019 Patient states she is still having constipation. She states she has bowel movements every 2 days. She is currently on Linzess and says some days it works and other days it doesn't. She states Linzess occasionally gives her loose bowels. She denies any heartburn or acid reflux. She is currently on Protonix 40 mg po qd.  She states neuropathy is bothering her in her legs and feet. Previous colonoscopy done at the hospital in 2017, showed poor prep. Denies any melena or hematochezia.  02/19/2020 Patient complains of abdominal pain in the lower abdomen, for a couple of weeks and continues to worsen. She does report constipation. She is taking Trulance, which worked well in the beginning and then stopped having bowel movements, until every 4 days. She states Miralax worked for her. She states Linzess did not work for her either. She has never tried Amitiza in the past. Denies any fever or chills. She states abdominal pain improved, not as bad as before. She states feeling that the lining of the abdomen feels raw. She continues to take dicyclomine daily with no improvement.  04/17/2020 I called the patient. She complains of continued problems with her bowels. She still has symptoms of constipation, with bowel movements every 4-5 days. She was taking Amitiza 8 mcg po bid, which was helping for a while. She denies any bloating, but does report abdominal cramping. Patient is complaining of constipation and bloating.  She also complains of occasional heartburn Transportation (Medical): No    Lack of Transportation (Non-Medical): No   Physical Activity: Unknown    Days of Exercise per Week: 0 days    Minutes of Exercise per Session: Not on file   Stress: Not on file   Social Connections: Not on file   Intimate Partner Violence: Not At Risk    Fear of Current or Ex-Partner: No    Emotionally Abused: No    Physically Abused: No    Sexually Abused: No   Housing Stability: Not on file       OB History    No obstetric history on file. Past Surgical History:   Procedure Laterality Date    BREAST ENHANCEMENT SURGERY  2007       Health Maintenance   Topic Date Due    Varicella vaccine (1 of 2 - 2-dose childhood series) Never done    HIV screen  Never done    Hepatitis C screen  Never done    DTaP/Tdap/Td vaccine (1 - Tdap) Never done    Cervical cancer screen  Never done    COVID-19 Vaccine (3 - Booster for Pfizer series) 02/24/2022    Flu vaccine (1) 08/01/2022    Depression Monitoring  01/19/2024    Hepatitis A vaccine  Aged Out    Hib vaccine  Aged Out    Meningococcal (ACWY) vaccine  Aged Out    Pneumococcal 0-64 years Vaccine  Aged Out         Current Outpatient Medications:     escitalopram (LEXAPRO) 20 MG tablet, Take 20 mg by mouth daily, Disp: , Rfl:     Review of Systems   Constitutional:  Positive for fatigue. HENT: Negative. Eyes: Negative. Respiratory:  Positive for cough. Cardiovascular: Negative. Gastrointestinal: Negative. Endocrine: Negative. Genitourinary: Negative. Musculoskeletal:  Positive for arthralgias. Skin: Negative. Allergic/Immunologic: Negative. Neurological: Negative. Hematological: Negative. Psychiatric/Behavioral: Negative. Vitals:    01/19/23 0901   BP: 117/74   Pulse: 77   Temp: 98.3 °F (36.8 °C)   SpO2: 97%        Physical Exam  Constitutional:       Appearance: Normal appearance. HENT:      Head: Normocephalic.       Nose: Nose normal.   Eyes:      Extraocular Movements: Extraocular movements intact. Pupils: Pupils are equal, round, and reactive to light. Cardiovascular:      Rate and Rhythm: Normal rate and regular rhythm. Pulmonary:      Effort: Pulmonary effort is normal.      Breath sounds: Normal breath sounds. Abdominal:      General: Abdomen is flat. Palpations: Abdomen is soft. Musculoskeletal:         General: Normal range of motion. Cervical back: Normal range of motion and neck supple. Skin:     General: Skin is warm and dry. Neurological:      General: No focal deficit present. Mental Status: She is alert and oriented to person, place, and time. Psychiatric:         Mood and Affect: Mood normal.         Behavior: Behavior normal.        PHQ-9=10      Assessment & Plan:  1. Arthralgia, unspecified joint  - Comprehensive Metabolic Panel; Future  - Sedimentation Rate; Future  - Sedimentation Rate  - Comprehensive Metabolic Panel    2. Other fatigue  - Comprehensive Metabolic Panel; Future  - CBC with Auto Differential; Future  - Vitamin D 25 Hydroxy; Future  - Vitamin D 25 Hydroxy  - CBC with Auto Differential  - Comprehensive Metabolic Panel    3. Class 3 severe obesity due to excess calories without serious comorbidity with body mass index (BMI) of 40.0 to 44.9 in adult [facility])  - Comprehensive Metabolic Panel; Future  - TSH; Future  - Lipid Panel; Future  - Lipid Panel  - TSH  - Comprehensive Metabolic Panel    4. Cough, unspecified type  - XR CHEST PA LAT (2 VIEWS); Future      Greater than 50% counseling and/or coordination of care: the treatment regimen is extensive; detailed review. Will notify of labs/xray. F/U prn. This note was dictated using dragon voice recognition software. It has been proofread, but there may still exist voice recognition errors that the author did not detect.       Signed By: STACY Vega - CNP     January 19, 2023

## 2025-07-18 NOTE — HPI-TODAY'S VISIT:
11/11/2020 Patient presents for office follow up visit. She reports some occasional constipation. She states Amitiza po bid, helped better than Linzess and Trulance. She is having bowel movements every two days. She has mild acid reflux. She has recently started Percocet, but stopped taking it as it hasn't helped with her neck pain.  05/12/2021 Patient presents for a follow up office visit. Colonoscopy on 01/31/2017 showed normal colonic mucosa. Random colon biopsies were normal in 2017. Patient reports occasional abdominal cramping, gas and bloating, mainly when she eats ice cream. She currently takes Amitiza 24mcg twice a day which is helping her constipation. She also takes pantoprazole 40mg and Levsin. She has stopped Pentasa as of 3 years ago. She currently denies any acid reflux symptoms.   11/17/2021 Patient is presently hypotensive (75/45). She states she has been experiencing leg weakness, general fatigue, and dizziness. She sustained a fall on Monday and injured her right knee. Patient denies any changes in bowel habits.  06/14/2022 Patient presents for IBS and acid reflux. Patient takes amitiza but says that she still has constipation. Patient states that linzess did not help her and that the amitiza helps her more. Patient states the acid reflux is okay. Patient states that she fell around Thanksgiving and that she had developed a brain bleed. The patient states that it was a very bad pain. She also states that she went to the hospital in January due to very bad blood pressure drop. The last time the patient went to the hospital was in March for leg pain and said she had clots. She says that she is not able to walk very well. Patient says she gets abdominal pain at times.  10/19/2022 Patient presents for a follow up. Patient is anemic. Patient states that she started coughing bad this morning and that she woke up with her left side eye shut and that it was hard to open. She also states that she has red eyes and blurred vision. She denies melena and hematochezia. Patient states that she takes xeralto for blood clots in her legs and that her last blood clot was in February.  12/18/2024 Patient presents for follow up. Patient has anemia and recently had labs [facility] showed her iron levels were low and PCP recommended infusions if her levels did not improve. Patient has bloating and abdominal pain intermittently when she uses whole milk. She says she occasionally has acid reflux. Patient has low blood pressure.  7/18/025 patient presents for follow up visit. Patient daughter accompanied her to her visit. Patient has severe chills and intense shivering. Patient's daughter mentions that she is off her medication. Patient is shown o be very confused. She has been experiencing intense nausea and black stools. Patient may have infection and needs to go to the ER. Ambulance was called.

## 2025-07-19 ENCOUNTER — CLAIMS CREATED FROM THE CLAIM WINDOW (OUTPATIENT)
Dept: URBAN - METROPOLITAN AREA MEDICAL CENTER 24 | Facility: MEDICAL CENTER | Age: 70
End: 2025-07-19

## 2025-07-19 PROCEDURE — 99254 IP/OBS CNSLTJ NEW/EST MOD 60: CPT

## 2025-07-20 ENCOUNTER — CLAIMS CREATED FROM THE CLAIM WINDOW (OUTPATIENT)
Dept: URBAN - METROPOLITAN AREA MEDICAL CENTER 24 | Facility: MEDICAL CENTER | Age: 70
End: 2025-07-20

## 2025-07-20 PROCEDURE — 43235 EGD DIAGNOSTIC BRUSH WASH: CPT | Performed by: INTERNAL MEDICINE

## 2025-07-21 ENCOUNTER — CLAIMS CREATED FROM THE CLAIM WINDOW (OUTPATIENT)
Dept: URBAN - METROPOLITAN AREA MEDICAL CENTER 24 | Facility: MEDICAL CENTER | Age: 70
End: 2025-07-21

## 2025-07-21 PROCEDURE — 45380 COLONOSCOPY AND BIOPSY: CPT

## 2025-07-21 PROCEDURE — 99232 SBSQ HOSP IP/OBS MODERATE 35: CPT

## 2025-07-22 ENCOUNTER — CLAIMS CREATED FROM THE CLAIM WINDOW (OUTPATIENT)
Dept: URBAN - METROPOLITAN AREA MEDICAL CENTER 24 | Facility: MEDICAL CENTER | Age: 70
End: 2025-07-22

## 2025-07-22 PROCEDURE — 99232 SBSQ HOSP IP/OBS MODERATE 35: CPT

## 2025-08-22 ENCOUNTER — CLAIMS CREATED FROM THE CLAIM WINDOW (OUTPATIENT)
Dept: URBAN - METROPOLITAN AREA MEDICAL CENTER 24 | Facility: MEDICAL CENTER | Age: 70
End: 2025-08-22

## 2025-08-22 PROCEDURE — 99254 IP/OBS CNSLTJ NEW/EST MOD 60: CPT
